# Patient Record
Sex: MALE
[De-identification: names, ages, dates, MRNs, and addresses within clinical notes are randomized per-mention and may not be internally consistent; named-entity substitution may affect disease eponyms.]

---

## 2018-06-18 LAB
ALBUMIN SERPL BCP-MCNC: 2.2 G/DL (ref 3.2–5.5)
ALP SERPL-CCNC: 313 IU/L (ref 42–121)
ALT SERPL W P-5'-P-CCNC: 36 IU/L (ref 10–60)
AST SERPL W P-5'-P-CCNC: 66 IU/L (ref 10–42)
BUN BLD-MCNC: 20 MG/DL (ref 6–20)
FERRITIN SERPL-MCNC: > 1500 NG/ML (ref 23.9–336.2)
GLUCOSE SERPLBLD-MCNC: 121 MG/DL (ref 65–120)
IRON SERPL-MCNC: 41 UG/DL (ref 45–182)
POTASSIUM SERPL-SCNC: 5.1 MEQ/L (ref 3.6–5)
TRANSFERRIN SERPL-MCNC: 147 MG/DL (ref 180–329)

## 2018-06-19 ENCOUNTER — HOSPITAL ENCOUNTER (OUTPATIENT)
Dept: HOSPITAL 97 - DS | Age: 78
Discharge: HOME | End: 2018-06-19
Attending: INTERNAL MEDICINE
Payer: COMMERCIAL

## 2018-06-19 DIAGNOSIS — D50.9: ICD-10-CM

## 2018-06-19 DIAGNOSIS — D64.81: Primary | ICD-10-CM

## 2018-06-19 DIAGNOSIS — C34.31: ICD-10-CM

## 2018-06-19 LAB — HCT VFR BLD CALC: 29.2 % (ref 39.6–49)

## 2018-06-19 PROCEDURE — 82728 ASSAY OF FERRITIN: CPT

## 2018-06-19 PROCEDURE — 85018 HEMOGLOBIN: CPT

## 2018-06-19 PROCEDURE — 84466 ASSAY OF TRANSFERRIN: CPT

## 2018-06-19 PROCEDURE — 36430 TRANSFUSION BLD/BLD COMPNT: CPT

## 2018-06-19 PROCEDURE — 86900 BLOOD TYPING SEROLOGIC ABO: CPT

## 2018-06-19 PROCEDURE — 86901 BLOOD TYPING SEROLOGIC RH(D): CPT

## 2018-06-19 PROCEDURE — 85014 HEMATOCRIT: CPT

## 2018-06-19 PROCEDURE — 86850 RBC ANTIBODY SCREEN: CPT

## 2018-06-19 PROCEDURE — 83540 ASSAY OF IRON: CPT

## 2018-06-19 PROCEDURE — 80053 COMPREHEN METABOLIC PANEL: CPT

## 2018-06-19 PROCEDURE — 36415 COLL VENOUS BLD VENIPUNCTURE: CPT

## 2018-06-19 NOTE — XMS REPORT
Clinical Summary

 Created on:2018



Patient:Robi Alcocer

Sex:Male

:1940

External Reference #:[id number]





Demographics







 Address  [address] [place] [address]

 

 Home Phone  [phone]

 

 Mobile Phone  [phone]

 

 Email Address  [email or web address]

 

 Preferred Language  English

 

 Marital Status  

 

 [employer] Affiliation  Unknown

 

 Race  White

 

 Ethnic Group  Not  or 









Author







 Organization  [facility]

 

 Address  [address] [place] [address]









Support







 Name  Relationship  Address  Phone

 

 Leodan White  Spouse  Unavailable  [phone]









Care Team Providers







 Name  Role  Phone

 

 Zoya Monaco DO  Primary Care Provider  [phone]









Allergies

No Known Allergies



Current Medications







 Prescription  Sig.  Disp.  Refills  Start Date  End Date  Status

 

 atorvastatin  TK 1 T PO QD    1  2017    Active



 (LIPITOR) 40 MG  HS          



 tablet            

 

 metoprolol tartrate  1 tablet daily    0  2017    Active



 (LOPRESSOR) 50 mg            



 tablet            

 

 aspirin (ECOTRIN)  Take 81 mg by          Active



 81 MG enteric  mouth daily.          



 coated tablet            

 

 clopidogrel  Take 75 mg by          Active



 (PLAVIX) 75 mg  mouth daily.          



 tablet            

 

 clopidogrel  TK 1 T PO QD    1  2017  Discontinued



 (PLAVIX) 75 mg            



 tablet            

 

 traMADol (ULTRAM)  Take 1 tablet  20 tablet  0  2017  10/04/2017  



 50 mg tablet  (50 mg total)          



   by mouth every          



   6 (six) hours          



   as needed for          



   moderate pain          



   for up to 10          



   days.          









 Hospital, Clinic,  Ordered Dose  Route  Frequency  Start Date  End Date  Status



 or Other Facility            



 Administered            



 Medication            

 

 acetaminophen  650 mg  oral  every 6 hours  2017    Active



 (TYLENOL) tablet      PRN      



 650 mg            

 

 acetaminophen  650 mg  oral  every 4 hours  2017  Discontinued



 (TYLENOL) tablet      PRN    7  



 650 mg            







Active Problems







 Problem  Noted Date

 

 Carotid stenosis  2017

 

 CVA (cerebral vascular accident)  2017

 

 Bilateral carotid artery stenosis  2017







Encounters







 Date  Type  Specialty  Care Team  Description

 

 10/05/2017  Office Visit  Cardiovascular  Ch[facility]ler  Surgery follow-up



       Bailee  examination (Primary



       Satish Ellington)



       MD  

 

 10/05/2017  Documentation  Cardiovascular  Asya Joshi MA  

 

 2017  Orders Only  Cardiovascular  Asya Joshi,  Status post carotid



       MA  surgery (Primary Dx)

 

 2017  Hospital Encounter  Cardiology  Chandler,  Arteriosclerosis of



 -      Bailee  carotid artery,



 2017      Rakel,  unspecified laterality



       MD  

 

 2017  Procedure Pass  Cardiothoracic    



     Surgery    

 

 2017  Surgery  Cardiothoracic  Chandler,  LEFT CAROTID



     Surgery  Bailee  ENDARTERECTOMY, TCD



       Rakel,  AND EEG MONITORING



       MD  

 

 2017  Hospital Encounter  Radiology  Chandler,  Preop testing



       Bailee Ellington MD  

 

 2017  Pre-Admit Testing  Pre-Admission  Chandler,  Preop testing (
Primary Dx);



   Appointment  Testing  Bailee  Stenosis of carotid artery, unspecified 
laterality



       MD Rakel  

 

 2017  Anesthesia Event  Cardiothoracic  Alvaro,  



     Surgery  Edilma MORENO,  



       APRN  

 

 2017  Office Visit  Cardiovascular  Chandler,  Bilateral carotid



       Bailee  artery stenosis



       Rakel  (Primary Dx)



       MD  

 

 2017  Orders Only  Cardiovascular  Chaves,  Stenosis of carotid



       Martine, MA  artery, unspecified



         laterality (Primary



         Dx)



after 2017



Family History







 Medical History  Relation  Name  Comments

 

 No Known Problems  Father    

 

 No Known Problems  Mother    









 Relation  Name  Status  Comments

 

 Father      

 

 Mother      







Social History







 Tobacco Use  Types  Packs/Day  Years Used  Date

 

 Former Smoker        Quit: 









 Smokeless Tobacco: Never Used      









 Alcohol Use  Drinks/Week  oz/Week  Comments

 

 Yes      occasinal/random









 Sex Assigned at Birth  Date Recorded

 

 Not on file  







Last Filed Vital Signs







 Vital Sign  Reading  Time Taken

 

 Blood Pressure  121/68  10/05/2017  1:02 PM CDT

 

 Pulse  81  10/05/2017  1:02 PM CDT

 

 Temperature  36.7 C (98.1 F)  10/05/2017  1:02 PM CDT

 

 Respiratory Rate  14  10/05/2017  1:02 PM CDT

 

 Oxygen Saturation  96%  2017  4:57 PM CDT

 

 Inhaled Oxygen Concentration  -  -

 

 Weight  73.5 kg (162 lb)  10/05/2017  1:02 PM CDT

 

 Height  175.3 cm (5' 9")  10/05/2017  1:02 PM CDT

 

 Body Mass Index  23.92  10/05/2017  1:02 PM CDT







Plan of Treatment







 Health Maintenance  Due Date  Last Done  Comments

 

 SHINGRIX VACCINE (#1)  1991    

 

 ZOSTER VACCINE  2001    

 

 PNEUMOCOCCAL POLYSACCHARIDE VACCINE AGE 65 AND OVER  2006    

 

 PNEUMOCOCCAL-13  2006    

 

 INFLUENZA VACCINE  2018    







Implants







 Implanted  Type  Area    Device  Expiration  Model /



         Identifier  Date  Serial / Lot

 

 Clip Ligtng Weck Hemoclip Plus W/ Tape Ti Med - [id number]  Medical  N/A:  
TELEFLEX      [id number] /



 Implanted: 2017 (Quantity not on file)  Clips for  N/A  MEDICAL       /



   Internal          



   Use          

 

 Clip Ligtng Weck Hemoclip Plus W/ Tape Ti Sm Strngpnt - [id number]  Medical  N/A
:  WECK CLOSURE      [id number] /



 Implanted: 2017 (Quantity not on file)  Clips for  N/A  SYSTEMS       /



   Internal          



   Use          

 

 Kit Shunt Crtd Artery Rdopq Line 6in Strl [place] - [id number]  Surgical  N/A:  
COVIDIEN BOZENA    2021  [id number] /



 Implanted: 2017 (Quantity not on file)  Implants;  N/A  HEALTHCARE       
/



   Expanders;          [id number]



   Extenders;          



   Surgical          



   Wires          

 

 Fibrin Sealant Patch Evarrest - [id number]  Surgical  N/A:  ETHICON    2018  [id number] /



 Implanted: 2017 (Quantity not on file)  Implants;  N/A  ENDO-SURGERY    
   /



   Expanders;          [id number]



   Extenders;          



   Surgical          



   Wires          

 

 Patch Vasclr Perph 0.8x8cm Vascu-Guard - [id number]  Vascular  N/A:  CALABRESE    
10/04/2021  [id number] /



 Implanted: Qty: 1 on 2017 by Bailee Reno MD  Graft  N/
A  BIOSCIENCE       /



             [id number]







Procedures







 Procedure Name  Priority  Date/Time  Associated  Comments



       Diagnosis  

 

 US CAROTID DUPLEX  Routine  10/05/2017 11:50  Status post  Results for this



 BILATERAL    AM CDT  carotid surgery  procedure are in



         the results



         section.

 

 INTRAOPERATIVE  Routine  2017  2:54    Results for this



 MONITORING    PM CDT    procedure are in



         the results



         section.

 

 CBC WITH PLATELET AND  Routine  2017  5:20    Results for this



 DIFFERENTIAL    AM CDT    procedure are in



         the results



         section.

 

 ESTIMATED GFR  Routine  2017  4:00    Results for this



     AM CDT    procedure are in



         the results



         section.

 

 BASIC METABOLIC PANEL  Routine  2017  4:00    Results for this



     AM CDT    procedure are in



         the results



         section.

 

 GLUCOSE LEVEL, SYRINGE  STAT  2017  4:13    Results for this



     PM CDT    procedure are in



         the results



         section.

 

 IONIZED CALCIUM,  STAT  2017  4:13    Results for this



 ARTERIAL    PM CDT    procedure are in



         the results



         section.

 

 POTASSIUM, SYRINGE  STAT  2017  4:13    Results for this



     PM CDT    procedure are in



         the results



         section.

 

 HEMOGLOBIN, SYRINGE  STAT  2017  4:13    Results for this



     PM CDT    procedure are in



         the results



         section.

 

 SODIUM LEVEL, SYRINGE  STAT  2017  4:13    Results for this



     PM CDT    procedure are in



         the results



         section.

 

 ARTERIAL BLOOD GAS,  STAT  2017  4:13    Results for this



 CORRECTED    PM CDT    procedure are in



         the results



         section.

 

 GLUCOSE LEVEL, SYRINGE  STAT  2017  3:32    Results for this



     PM CDT    procedure are in



         the results



         section.

 

 IONIZED CALCIUM,  STAT  2017  3:32    Results for this



 ARTERIAL    PM CDT    procedure are in



         the results



         section.

 

 SODIUM LEVEL, SYRINGE  STAT  2017  3:32    Results for this



     PM CDT    procedure are in



         the results



         section.

 

 HEMOGLOBIN, SYRINGE  STAT  2017  3:32    Results for this



     PM CDT    procedure are in



         the results



         section.

 

 POTASSIUM, SYRINGE  STAT  2017  3:32    Results for this



     PM CDT    procedure are in



         the results



         section.

 

 ARTERIAL BLOOD GAS,  STAT  2017  3:32    Results for this



 CORRECTED    PM CDT    procedure are in



         the results



         section.

 

 DC AN ELECTIVE  Routine  2017  3:05    



 ENDOTRACHEAL AIRWAY    PM CDT    









   Procedure Note - Janes Clay MD - 2017  3:03 PM CDT



Airway



   Performed by: JANES CLAY



   Authorized by: JANES CLAY



   



   Location:  Endoscopy



   Urgency:  Elective



   Difficult Airway: No



   Anesthesiologist:  JANES CLAY



   Resident/CRNA:  RODRICK RIVERA



   Performed by:



      anesthesiologist and resident/CRNA



   Preoxygenated with 100% O2: Yes



   Mask Ventilation:  Easy mask



   Final Airway Type:  Endotracheal airway



   Final Endotracheal Airway:  ETT



   Cuffed: Yes



   Technique Used:  Direct laryngoscopy



   Insertion Site:  Oral



   Blade Type:  Chiu



   Laryngoscope Blade/Videolaryngoscope Blade Size:  2



   ETT Size (mm):  7.0



   Cuff at minimum occlusion pressure: Yes



   Measured from:  Lips



   ETT to Lips (cm):  23



   Placement Verified by: direct visualization



   Laryngoscopic view:  Grade I - full view of glottis



   Number of Attempts at Approach:  2



    First attempt by student CRNA, adonis 2a view but unable to pass ETT. 2nd 
attempt attending: Mikki. ETT passed atraumatically without stylet. No 
injury to lips, oropharynx, vocal cords









 ARTERIAL LINE  Routine  2017  3:03    Results for this



     PM CDT    procedure are in



         the results



         section.

 

 SURGICAL PATHOLOGY  Routine  2017  7:53    Results for this



 REQUEST    AM CDT    procedure are in



         the results



         section.

 

 XR CHEST 2 VW  Routine  2017 11:55  Preop testing  Results for this



     AM CDT    procedure are in



         the results



         section.

 

 HC COMPLETE BLD COUNT  STAT  2017 11:25  Stenosis of carotid  Results 
for this



 W/AUTO DIFF    AM CDT  artery, unspecified  procedure are in



       laterality  the results



         section.

 

 ECG PRE/POST OP  Routine  2017 11:10  Preop testing  Results for this



     AM CDT    procedure are in



         the results



         section.

 

 PREPARE RBC  Routine  2017 10:27    Results for this



     AM CDT    procedure are in



         the results



         section.

 

 ESTIMATED GFR  STAT  2017 10:27    Results for this



     AM CDT    procedure are in



         the results



         section.

 

 TYPE AND SCREEN  Routine  2017 10:27  Preop testing  Results for this



     AM CDT    procedure are in



         the results



         section.

 

 PARTIAL THROMBOPLASTIN  STAT  2017 10:27  Preop testing  Results for this



 TIME (PTT)    AM CDT    procedure are in



         the results



         section.

 

 PROTHROMBIN TIME WITH  STAT  2017 10:27  Preop testing  Results for this



 INR    AM CDT    procedure are in



         the results



         section.

 

 CBC HEMOGRAM  STAT  2017 10:27  Preop testing  Results for this



     AM CDT    procedure are in



         the results



         section.

 

 COMPREHENSIVE METABOLIC  STAT  2017 10:27  Stenosis of carotid  Results 
for this



 PANEL    AM CDT  artery, unspecified  procedure are in



       laterality  the results



         section.

 

 URINALYSIS SCREEN AND  STAT  2017 10:24    Results for this



 MICROSCOPY, WITH REFLEX    AM CDT    procedure are in



 TO CULTURE        the results



         section.

 

 URINE CULTURE  STAT  2017 10:24    Results for this



     AM CDT    procedure are in



         the results



         section.

 

 ECHOCARDIOGRAM 2D  Routine  2017 12:11    Results for this



 COMPLETE W MMODE    PM CDT    procedure are in



 SPECTRAL COLOR DOPPLER        the results



 (06153)        section.



after 2017



Results

Pv carotid duplex (10/05/2017 11:50 AM)





 Narrative  Performed At

 

 PERIPHERAL VASCULAR LABORATORY



  [facility]



  



  



 Carotid Duplex Report



  



  



  



 [address] [place], [place] [phone]



  



  



  



 For  purposes, the categorization of the degree of the  



 stenosis of this exam is based on criteria described in the IAC carotid  



 stenosis grading white paper( www.intersocietal.org/Vascular) and in  



 KHURRAM Martines., KIMBERLY Fitzpatrick, et al. Carotid



  



  artery stenosis: gray-scale and Doppler US diagnosis--Society of  



 Radiologists in Ultrasound Consensus Conference. Radiology. 2003 Nov;  



 229(2):340-6.



  



  



  



 Pat.Name:ROBI ALCOCER  



 Pat.ID:[id number] 



  



 .Date: 10/5/2017  



 Refer.MD:BAILEE RENO MD 



  



 Exam Time: 10:07:00 AM Study  



 Type:Carotid 



  



 DOBAge:1940,77Y Sex:  



 MALE



  



 Sonogrphr: [facility] [place] RDMS, [facility] CPT - 4:  



 [address] 



  



 Echo Event ID:[id number]



  



 Order ID:[id number]



  



 Reason for Study:Follow-up s/p left carotid endarterectomy with patch



  



 angioplasty on 2017.



  



 Procedures:2017Left carotid endarterectomy with patch



  



 angioplasty.



  



 Race:C 



  



 ------------------------------------



  



 SUMMARY:



  



 ------------------------------------



  



 CAROTID ARTERY SCAN



  



  RIGHT:There is smooth intimal lining in the common carotid artery. 



  



 There is irregular calcified plaque noted in the bulb extending into



  



 the proximal internal carotid artery.Colorflow is  



 disturbed.There



  



 is antegrade flow in the vertebral artery. 



  



  



  



  LEFT:There is smooth homogenous plaque in the common carotid



  



 artery.There is no plaque noted in the bulb and the internal carotid



  



 artery, s/p carotid endarterectomy.Colorflow is normal.There is



  



 antegrade flow in the vertebral artery. 



  



  



  



  PHYSICIAN INTERPRETATION 



  



  



  



  1.50-69% stenosis of the right internal carotid artery.



  



  2.Left internal carotid artery is normal, s/p carotid



  



 endarterectomy.



  



  3. Both vertebral arteries are antegrade



  



  



  



 Carotid  



 Findings:RightLeft  



  



  



 Verteb.Flw  



 AntegradeAntegrade  



 



  



 Subclavian  



 TriphasicBiphasic  



  



  



 ------------------------------------



  



 MEASUREMENTS:



  



 ------------------------------------



  



 DOPPLER



  



 Left Bulb



  



  Bulb  cm/sBulb  



 EDV17.3 cm/s



  



 Left CCA Dist



  



  CCA Dist  cm/sCCA Dist  



 EDV28.3 cm/s



  



 Left CCA Mid



  



  CCA Mid [id number] cm/sCCA Mid EDV  



 18.9 cm/s



  



 Left CCA Prox



  



  CCA Prox  cm/sCCA Prox  



 EDV18.1 cm/s



  



 Left ICA Dist



  



  ICA Dist  cm/Lilia Dist  



 EDV34 cm/s



  



 Left ICA Mid



  



  ICA Mid PSV 44.8 cm/Lilia Mid EDV  



 12.9 cm/s



  



 Left ICA Prox



  



  ICA Prox  cm/Lilia Prox  



 EDV14.1 cm/s



  



 Left ECA Prox



  



  ECA Prox  cm/sECA Prox  



 EDV11.7 cm/s



  



 Left Vertebral



  



  Vertebral PSV 93.5 cm/sVertebral EDV 18.9  



 cm/s



  



 Right Bulb



  



  Bulb  cm/sBulb  



 EDV26.7 cm/s



  



 Right CCA Dist



  



  CCA Dist  cm/sCCA Dist  



 EDV28.3 cm/s



  



 Right CCA Mid



  



  CCA Mid WKS351 cm/sCCA Mid EDV  



 22.8 cm/s



  



 Right CCA Prox



  



  CCA Prox  cm/sCCA Prox  



 EDV23.6 cm/s



  



 Right ICA Dist



  



  ICA Dist  cm/Lilia Dist  



 EDV32.2 cm/s



  



 Right ICA Mid



  



  ICA Mid PSV 99.2 cm/Lilia Mid EDV  



 27.5 cm/s



  



 Right ICA Prox



  



  ICA Prox  cm/Lilia Prox  



 EDV45.8 cm/s



  



 Right ECA Prox



  



  ECA Prox  cm/sECA Prox  



 EDV11.8 cm/s



  



 Right Vertebral



  



  Vertebral DZR100 cm/sVertebral EDV 21.2  



 cm/s



  



 Right ICA/CCA Ratio



  



  ICA/CCA PSV 1.35



  



 Left ICA/CCA Ratio



  



  ICA/CCA PSV1.1 



  



  



  



 Signed 10/05/2017 03:22 PM



  



 Gregor Dacosta MD, RPVI  









 Procedure Note

 

 Interface, Radiology Results In - 10/05/2017  3:22 PM CDT



                    PERIPHERAL VASCULAR LABORATORY



 



                         Carotid Duplex Report



 



         [address] [place]  [address] [phone]



 



 For  purposes, the categorization of the degree of the 
stenosis of this exam is based on criteria described in the IAC carotid 
stenosis grading white paper( www.intersocietal.org/Vascular) and in



 KHURRAM Martines., KIMBERLY Fitzpatrick, et al. Carotid



  artery stenosis: gray-scale and Doppler US diagnosis--Society of Radiologists 
in Ultrasound Consensus Conference. Radiology. 2003 Nov; 229(2):340-6.



 



 Pat.Name:  ROBI ALCOCER           Pat.ID:    [id number]



 St.Date:   10/5/2017               Refer.MD:  BAILEE RENO MD



 Exam Time: 10:07:00 AM             Study Type:Carotid



   Age:  1940,77Y           Sex:       MALE



 Sonogrphr: Home JORGE WestMS, RVT CPT - 4:   67803



 Echo Event ID:[id number]



 Order ID:  [id number]



 Reason for Study:Follow-up s/p left carotid endarterectomy with patch



 angioplasty on 2017.



 Procedures:2017  Left carotid endarterectomy with patch



 angioplasty.



 Race:      C



 ------------------------------------



 SUMMARY:



 ------------------------------------



 CAROTID ARTERY SCAN



  RIGHT:  There is smooth intimal lining in the common carotid artery.



 There is irregular calcified plaque noted in the bulb extending into



 the proximal internal carotid artery.  Colorflow is disturbed.  There



 is antegrade flow in the vertebral artery.



 



  LEFT:  There is smooth homogenous plaque in the common carotid



 artery.  There is no plaque noted in the bulb and the internal carotid



 artery, s/p carotid endarterectomy.  Colorflow is normal.  There is



 antegrade flow in the vertebral artery.



 



  PHYSICIAN INTERPRETATION



 



  1.  50-69% stenosis of the right internal carotid artery.



  2.  Left internal carotid artery is normal, s/p carotid



 endarterectomy.



  3. Both vertebral arteries are antegrade



 



 Carotid Findings:      Right                  Left



 Verteb.Flw             Antegrade              Antegrade



 Subclavian             Triphasic              Biphasic



 ------------------------------------



 MEASUREMENTS:



 ------------------------------------



                                 DOPPLER



 Left Bulb



  Bulb PSV         108 cm/s          Bulb EDV        17.3 cm/s



 Left CCA Dist



  CCA Dist PSV     119 cm/s          CCA Dist EDV    28.3 cm/s



 Left CCA Mid



  CCA Mid PSV      101 cm/s          CCA Mid EDV     18.9 cm/s



 Left CCA Prox



  CCA Prox PSV     115 cm/s          CCA Prox EDV    18.1 cm/s



 Left ICA Dist



  ICA Dist PSV     113 cm/s          ICA Dist EDV      34 cm/s



 Left ICA Mid



  ICA Mid PSV     44.8 cm/s          ICA Mid EDV     12.9 cm/s



 Left ICA Prox



  ICA Prox PSV     106 cm/s          ICA Prox EDV    14.1 cm/s



 Left ECA Prox



  ECA Prox PSV     101 cm/s          ECA Prox EDV    11.7 cm/s



 Left Vertebral



  Vertebral PSV   93.5 cm/s          Vertebral EDV   18.9 cm/s



 Right Bulb



  Bulb PSV         132 cm/s          Bulb EDV        26.7 cm/s



 Right CCA Dist



  CCA Dist PSV     116 cm/s          CCA Dist EDV    28.3 cm/s



 Right CCA Mid



  CCA Mid PSV      124 cm/s          CCA Mid EDV     22.8 cm/s



 Right CCA Prox



  CCA Prox PSV     132 cm/s          CCA Prox EDV    23.6 cm/s



 Right ICA Dist



  ICA Dist PSV     101 cm/s          ICA Dist EDV    32.2 cm/s



 Right ICA Mid



  ICA Mid PSV     99.2 cm/s          ICA Mid EDV     27.5 cm/s



 Right ICA Prox



  ICA Prox PSV     167 cm/s          ICA Prox EDV    45.8 cm/s



 Right ECA Prox



  ECA Prox PSV     123 cm/s          ECA Prox EDV    11.8 cm/s



 Right Vertebral



  Vertebral PSV    124 cm/s          Vertebral EDV   21.2 cm/s



 Right ICA/CCA Ratio



  ICA/CCA PSV     1.35



 Left ICA/CCA Ratio



  ICA/CCA PSV      1.1



 



 Signed 10/05/2017 03:22 PM



 Gregor Dacosta MD, RPVI









 Performing Organization  Address  City/State/Zipcode  Phone Number

 

  [facility]  [address] [place] [address]  



Intraoperative monitoring (2017  2:54 PM)





 Narrative  Performed At

 

 This result has an attachment that is not available.



  



  INTRAOPERATIVE NEURO MONITORING  



   



   



 Patient Name: Robi Alcocer  



 YOB: 1940  



 Gender: male  



 Date of Service: 2017  



   



   



 Surgical Procedure: Left Carotid Endarterectomy  



   



 OR: (FOR)  



 Room #: (4)  



 Tech #1: (KB/TG)  



 Start Time: (1153)  



 End Time: (1605)  



 Total Time (in hours): (4)  



   



 Physician IOM Time: 4  



 Procedure(s) performed During IOM:  



 90162 Electroencephalogram (EEG) during non-intracranial surgery x 1  



 31086 IOM remote or multiple cases x 2  



   



 ICD10:I77.9; I65.29; I63.132  



   



   



   



 Stimulation Parameters  



 Free running EEG recorded with bipolar derivation, using a modified  



 International 10/20 placements: Fp1-C3, C3-O1, Fp2-C4, C4-O2, T3-O1,  



 T4-O2, Fp1-T3, Fp2-T4.  



   



 Technical Summary  



 Intraoperative neurophysiological monitoring was performed using  



 free-running EEG. A real time connection with the examining neurologist  



 was established and maintained throughout the operative procedure by the  



 monitoring technologist. Pre-operative EEG was recorded in the holding  



 area and revealed no asymmetry. Free running EEG demonstrated symmetrical  



 high amplitude delta with superimposed faster frequencies most likely  



 secondary to inhaled anesthetic agents. After clamping of the left carotid  



 artery the EEG was judged to be essentially unchanged and the surgeon was  



 informed.  



   



 Conclusion  



 Free running EEG remained symmetrical throughout the remaining of the  



 procedure. No focal changes occurred. The surgeon was informed of all  



 findings in real time.  



   



   



CBC with platelet and differential (2017  5:20 AM)Only the most recent 
of2 resultswithin the time period is included.





 Component  Value  Ref Range  Performed At

 

 WBC  9.91  4.50 - 11.00 k/uL  [facility] DEPARTMENT OF



       PATHOLOGY AND GENOMIC



       MEDICINE

 

 RBC  3.57 (L)  4.40 - 6.00 m/uL  [facility] DEPARTMENT OF



       PATHOLOGY AND GENOMIC



       MEDICINE

 

 HGB  9.9 (L)  14.0 - 18.0 g/dL  [facility] DEPARTMENT OF



       PATHOLOGY AND GENOMIC



       MEDICINE

 

 HCT  31.6 (L)  41.0 - 51.0 %  [facility] DEPARTMENT OF



       PATHOLOGY AND GENOMIC



       MEDICINE

 

 MCV  88.5  82.0 - 100.0 fL  [facility] DEPARTMENT OF



       PATHOLOGY AND GENOMIC



       MEDICINE

 

 MCH  27.7  27.0 - 34.0 pg  [facility] DEPARTMENT OF



       PATHOLOGY AND GENOMIC



       MEDICINE

 

 MCHC  31.3  31.0 - 37.0 g/dL  [facility] DEPARTMENT OF



       PATHOLOGY AND GENOMIC



       MEDICINE

 

 RDW - SD  45.1  37.0 - 55.0 fL  [facility] DEPARTMENT OF



       PATHOLOGY AND GENOMIC



       MEDICINE

 

 MPV  9.7  8.8 - 13.2 fL  [facility] DEPARTMENT OF



       PATHOLOGY AND GENOMIC



       MEDICINE

 

 Platelet count  387  150 - 400 k/uL  [facility] DEPARTMENT OF



       PATHOLOGY AND GENOMIC



       MEDICINE

 

 Nucleated RBC  0.00  /100 WBC  [facility] DEPARTMENT OF



       PATHOLOGY AND GENOMIC



       MEDICINE

 

 Neutrophils  70.0 (H)  39.0 - 69.0 %  [facility] DEPARTMENT OF



       PATHOLOGY AND GENOMIC



       MEDICINE

 

 Lymphocytes  18.6 (L)  25.0 - 45.0 %  [facility] DEPARTMENT OF



       PATHOLOGY AND GENOMIC



       MEDICINE

 

 Monocytes  8.1  0.0 - 10.0 %  [facility] DEPARTMENT OF



       PATHOLOGY AND GENOMIC



       MEDICINE

 

 Eosinophils  2.3  0.0 - 5.0 %  [facility] DEPARTMENT OF



       PATHOLOGY AND GENOMIC



       MEDICINE

 

 Basophils  0.5  0.0 - 1.0 %  [facility] DEPARTMENT OF



       PATHOLOGY AND GENOMIC



       MEDICINE

 

 Immature granulocytes  0.5Comment:  0.0 - 1.0 %  [facility] DEPARTMENT OF



   "Immature    PATHOLOGY AND GENOMIC



   granulocytes"    MEDICINE



   (promyelocytes,    



   myelocytes,    



   metamyelocytes)    









 Specimen

 

 Blood









 Performing Organization  Address  City/State/Zipcode  Phone Number

 

 [facility] DEPARTMENT OF PATHOLOGY AND  [address] [place] [address]  



 [employer] [facility]      



Estimated GFR (2017  4:00 AM)Only the most recent of2 resultswithin the 
time period is included.





 Component  Value  Ref Range  Performed At

 

 GFR Non Af Amer  59 (A)  mL/min/1.73 m2  [facility] DEPARTMENT OF



       PATHOLOGY AND GENOMIC



       MEDICINE

 

 GFR Af Amer  71  mL/min/1.73 m2  [facility] DEPARTMENT OF



   Comment:    PATHOLOGY AND GENOMIC



   Chronic kidney disease: <60 mL/min/1.73m2    MEDICINE



   Kidney failure: <15 mL/min/1.73m2    



   The estimated GFR is calculated from the IDMS-traceable Modification of Diet
    



   in Renal Disease Equation. The accuracy of the calculation is poor when the 
   



   creatinine is normal. Calculated values >90 mL/min/1.73m2 are not reported. 
   



   This equation has not been validated in children (<18 years), pregnant    



   women, the elderly (>70 years), or ethnic groups other than Caucasians and  
  



    Americans.    



       









 Specimen

 

 Plasma specimen









 Performing Organization  Address  City/[facility]  Phone Number

 

 [facility] DEPARTMENT OF PATHOLOGY AND  [address] [place] [address]      



Basic metabolic panel (2017  4:00 AM)





 Component  Value  Ref Range  Performed At

 

 Sodium  137  135 - 148 mEq/L  [facility] DEPARTMENT OF PATHOLOGY



       AND GENOMIC MEDICINE

 

 Potassium  4.3  3.5 - 5.0 mEq/L  [facility] DEPARTMENT OF PATHOLOGY



       AND GENOMIC MEDICINE

 

 Chloride  98  98 - 112 mEq/L  [facility] DEPARTMENT OF PATHOLOGY



       AND GENOMIC MEDICINE

 

 CO2  27  24 - 31 mEq/L  [facility] DEPARTMENT OF PATHOLOGY



       AND GENOMIC MEDICINE

 

 Anion gap  12  7 - 15 mEq/L  [facility] DEPARTMENT OF PATHOLOGY



   Comment:    AND [facility]



   Starting from  , anion gap calculation    



   no longer incorporates potassium. Please note the change.    



       

 

 BUN  15  8 - 23 mg/dL  [facility] DEPARTMENT OF PATHOLOGY



       AND GENOMIC MEDICINE

 

 Creatinine  1.2  0.7 - 1.2 mg/dL  [facility] DEPARTMENT OF PATHOLOGY



       AND GENOMIC MEDICINE

 

 Glucose  129 (H)  65 - 99 mg/dL  [facility] DEPARTMENT OF PATHOLOGY



       AND GENOMIC MEDICINE

 

 Calcium  9.5  8.8 - 10.2 mg/dL  [facility] DEPARTMENT OF PATHOLOGY



       AND GENOMIC MEDICINE









 Specimen

 

 Plasma specimen









 Performing Organization  Address  City/[facility]  Phone Number

 

 [facility] DEPARTMENT OF PATHOLOGY AND  [address] [place] [address]  



 GENOMIC MEDICINE      



Sodium level, syringe (2017  4:13 PM)Only the most recent of2 
resultswithin the time period is included.





 Component  Value  Ref Range  Performed At

 

 Sodium, syringe  133 (L)  135 - 148 mEq/L  [facility] DEPARTMENT OF PATHOLOGY AND 
GENOMIC



       MEDICINE









 Specimen

 

 Blood









 Performing Organization  Address  City/[facility]  Phone Number

 

 [facility] DEPARTMENT OF PATHOLOGY AND  [address] [place] [address] [phone] [place] [address]      



Potassium, syringe (2017  4:13 PM)Only the most recent of2 resultswithin 
the time period is included.





 Component  Value  Ref Range  Performed At

 

 Potassium, syringe  4.5  3.5 - 5.0 mEq/L  [facility] DEPARTMENT OF PATHOLOGY AND 
GENOMIC



       MEDICINE









 Specimen

 

 Blood









 Performing Organization  Address  City/[facility]  Phone Number

 

 [facility] DEPARTMENT OF PATHOLOGY AND  [address] [place] [address]      



Ionized calcium, arterial (2017  4:13 PM)Only the most recent of2 
resultswithin the time period is included.





 Component  Value  Ref Range  Performed At

 

 Ionized calcium, arterial  1.30  1.11 - 1.32 mmol/L  [facility] DEPARTMENT OF 
PATHOLOGY AND



       GENOMIC MEDICINE









 Specimen

 

 Blood









 Performing Organization  Address  City/[facility]  Phone Number

 

 [facility] DEPARTMENT OF PATHOLOGY AND  [address] [place] [address]      



Hemoglobin, syringe (2017  4:13 PM)Only the most recent of2 resultswithin 
the time period is included.





 Component  Value  Ref Range  Performed At

 

 Hemoglobin, syringe  9.1 (L)  14.0 - 18.0 g/dL  [facility] DEPARTMENT OF PATHOLOGY 
AND GENOMIC



       MEDICINE









 Specimen

 

 Blood









 Performing Organization  Address  City/[facility]  Phone Number

 

 [facility] DEPARTMENT  PATHOLOGY AND  [address] [place] [address]      



Glucose level, syringe (2017  4:13 PM)Only the most recent of2 
resultswithin the time period is included.





 Component  Value  Ref Range  Performed At

 

 Glucose, syringe  172 (H)  65 - 99 mg/dL  [facility] DEPARTMENT OF PATHOLOGY AND 
GENOMIC



       MEDICINE









 Specimen

 

 Blood









 Performing Organization  Address  City/[facility]  Phone Number

 

 [facility] DEPARTMENT OF PATHOLOGY AND  [address] [place] [address]      



Arterial blood gas, corrected (2017  4:13 PM)Only the most recent of2 
resultswithin the time period is included.





 Component  Value  Ref Range  Performed At

 

 pH, arterial  7.37  7.35 - 7.45  [facility] DEPARTMENT OF PATHOLOGY AND GENOMIC



       MEDICINE

 

 pCO2, arterial  36  35 - 45 mmHg  [facility] DEPARTMENT OF PATHOLOGY AND GENOMIC



       MEDICINE

 

 pO2, arterial  304 (H)  80 - 90 mmHg  [facility] DEPARTMENT OF PATHOLOGY AND GENOMIC



       MEDICINE

 

 Temperature, Celsius  37.0  Degrees C  [facility] DEPARTMENT OF PATHOLOGY AND GENOMIC



       MEDICINE

 

 O2 saturation, arterial  100  95 - 100 %  [facility] DEPARTMENT OF PATHOLOGY AND 
GENOMIC



       MEDICINE

 

 pH, arterial corrected  7.37    [facility] DEPARTMENT OF PATHOLOGY AND GENOMIC



       MEDICINE

 

 pCO2, arterial corrected  36  mmHg  [facility] DEPARTMENT OF PATHOLOGY AND GENOMIC



       MEDICINE

 

 pO2, arterial corrected  304  mmHg  [facility] DEPARTMENT OF PATHOLOGY AND GENOMIC



       MEDICINE

 

 Base excess, arterial  -4 (L)  -2 - 2 mEq/L  [facility] DEPARTMENT OF PATHOLOGY AND 
GENOMIC



       MEDICINE









 Specimen

 

 Blood









 Performing Organization  Address  City/[facility]  Phone Number

 

 [facility] DEPARTMENT OF PATHOLOGY AND  [address] [place] [address]  



 GENOMIC MEDICINE      



Arterial line (2017  3:03 PM)





 Narrative  Performed At

 

 Thad Santiago MD 2017 12:34 PM



  



 Arterial line



  



 Performed by: JYOTI SANTIAGO



  



 Authorized by: BAILEE RENO 



  



  



  



 Patient Location:Pre-op



  



 Staff: 



  



 Resident/CRNA:JYOTI SANTIAGO



  



 Pre-procedure: patient identified, IV checked, site and side verified, 



  



 risks and benefits discussed, procedure verified, surgical consent 



  



 complete and pre-op evaluation complete



  



 MSBT: antiseptic used, all elements of maximal sterile barrier technique  



 



  



 followed, hand hygiene performed, cap/gown used by other personnel and 



  



 solutions labeled



  



 Indications: 



  



 Indications: multiple ABGs and hemodynamic monitoring



  



 Anesthesia: 



  



 Anesthesia:General



  



 Procedure Details: 



  



 Arterial Line placement:Placed pre-induction



  



 Line placement site:Radial



  



 Line placement side:Right



  



 Arterial line gauge:20 G



  



 Number of attempts:1



  



 Ultrasound guidance used: No



  



 Post-procedure: 



  



 Post-procedure:Sterile dressing applied



  



 Post procedure circulation, sensation, movement:Normal



  



 Patient tolerance:Patient tolerated the procedure well with no 



  



 immediate complications



  



 Notes: 



  



  Wire removed intact. No apparent complications.  



Surgical pathology request (2017  7:53 AM)





 Component  Value  Ref Range  Performed At

 

 Case number  [id number]    [facility] DEPARTMENT OF



       PATHOLOGY AND GENOMIC



       MEDICINE

 

 Surgical pathology report  See link below for PDF    [facility] DEPARTMENT OF



   Lab Report    PATHOLOGY AND GENOMIC



       MEDICINE









 Performing Organization  Address  City/[facility]  Phone Number

 

 [facility] DEPARTMENT OF PATHOLOGY AND  [address] [place] [address]  



 [facility]      



XR Chest 2 Vw (2017 11:55 AM)





 Narrative  Performed At

 

 EXAMINATION:XR CHEST 2 VW



   [employer]



  



  



 CLINICAL HISTORY:Z01.818 Encounter for other preprocedural  



 examination, preop



  



  



  



 COMPARISON:No priors



  



  



  



 IMPRESSION:



  



  



  



 Heart and mediastinum are appropriate for technique. Lungs are clear. No  



 effusions noted. 



  



  



  



 [facility]-[id number]



  



   









 Procedure Note

 

  Interface, Radiology Results Incoming - 2017 12:04 PM CDT



EXAMINATION:  XR CHEST 2 VW



 



 CLINICAL HISTORY:  Z01.818 Encounter for other preprocedural examination, preop



 



 COMPARISON:  No priors



 



 IMPRESSION:



 



 Heart and mediastinum are appropriate for technique. Lungs are clear. No 
effusions noted.



 



 [facility]-[id number]









 Performing Organization  Address  City/[facility]/Zipcode  Phone Number

 

 [facility]  [address] [place] [address]  



ECG Pre/Post Op (2017 11:10 AM)





 Component  Value  Ref Range  Performed At

 

 Ventricular rate  79    [facility] MUSE

 

 Atrial rate  79    [facility] MUSE

 

 DC interval  136    [facility] MUSE

 

 QRSD interval  84    [facility]

 

 QT interval  370    [facility]

 

 QTC interval  424    [facility] MUSE

 

 P axis 1  66    [facility] MUSE

 

 QRS axis 1  73    [facility] MUSE

 

 T wave axis  49    [facility] MUSE

 

 EKG impression  Normal sinus rhythm-Normal ECG-No previous    [facility] MUSE



   ECGs available-Electronically Signed By    



   Julio STEINBERG, Alexys CORTES ([id number]) on 2017    



   3:54:21 PM    









 Performing Organization  Address  City/[facility]  Phone Number

 

 [facility] MUSE  [address] [place] [phone]  



Partial thromboplastin time, activated (2017 10:27 AM)





 Component  Value  Ref Range  Performed At

 

 PTT  30.2  23.0 - 36.0 sec  [facility] DEPARTMENT OF PATHOLOGY



   Comment:    AND [employer] MEDICINE



   PTT therapeutic range for unfractionated heparin is    



   61.0-112.0 seconds which corresponds to Anti-Xa    



   0.3-0.7 U/ml.    



       









 Specimen

 

 Blood









 Performing Organization  Address  City/[facility]  Phone Number

 

 [facility] DEPARTMENT OF PATHOLOGY AND  [address] [place] [address]  



 GENOMIC MEDICINE      



Prothrombin time with INR (2017 10:27 AM)





 Component  Value  Ref Range  Performed At

 

 Prothrombin time  14.3  12.0 - 15.0 sec  [facility] DEPARTMENT OF



       PATHOLOGY AND GENOMIC



       MEDICINE

 

 INR  1.1    [facility] DEPARTMENT OF



   Comment:    PATHOLOGY AND GENOMIC



   The International Normalized Ratio (INR) is a therapeutic    MEDICINE



   monitoring tool for patients who are stable on oral    



   anticoagulant therapy. An INR of 2.0-3.0 is suggested for deep    



   vein thrombosis/pulmonary embolism.    



       









 Specimen

 

 Blood









 Performing Organization  Address  City/[facility]/Zipcode  Phone Number

 

 [facility] DEPARTMENT OF PATHOLOGY AND  [address] [place] [phone]  



 [employer]      



CBC hemogram (2017 10:27 AM)





 Component  Value  Ref Range  Performed At

 

 WBC  13.41 (H)  4.50 - 11.00 k/uL  [facility] DEPARTMENT OF PATHOLOGY AND GENOMIC



       MEDICINE

 

 RBC  3.93 (L)  4.40 - 6.00 m/uL  [facility] DEPARTMENT OF PATHOLOGY AND GENOMIC



       MEDICINE

 

 HGB  11.1 (L)  14.0 - 18.0 g/dL  [facility] DEPARTMENT OF PATHOLOGY AND GENOMIC



       MEDICINE

 

 HCT  34.6 (L)  41.0 - 51.0 %  [facility] DEPARTMENT OF PATHOLOGY AND GENOMIC



       MEDICINE

 

 MCV  88.0  82.0 - 100.0 fL  [facility] DEPARTMENT OF PATHOLOGY AND GENOMIC



       MEDICINE

 

 MCH  28.2  27.0 - 34.0 pg  [facility] DEPARTMENT OF PATHOLOGY AND GENOMIC



       MEDICINE

 

 MCHC  32.1  31.0 - 37.0 g/dL  [facility] DEPARTMENT OF PATHOLOGY AND GENOMIC



       MEDICINE

 

 RDW - SD  45.1  37.0 - 55.0 fL  [facility] DEPARTMENT OF PATHOLOGY AND GENOMIC



       MEDICINE

 

 MPV  10.0  8.8 - 13.2 fL  [facility] DEPARTMENT OF PATHOLOGY AND GENOMIC



       MEDICINE

 

 Platelet count  442 (H)  150 - 400 k/uL  [facility] DEPARTMENT OF PATHOLOGY AND 
GENOMIC



       MEDICINE

 

 Nucleated RBC  0.00  /100 WBC  [facility] DEPARTMENT OF PATHOLOGY AND GENOMIC



       MEDICINE









 Specimen

 

 Blood









 Performing Organization  Address  City/[facility]  Phone Number

 

 [facility] DEPARTMENT OF PATHOLOGY AND  [address] [place] [address]  



 [employer] MEDICINE      



Prepare RBC (2017 10:27 AM)





 Component  Value  Ref Range  Performed At

 

 Product name  Red Blood Cells AS-1,    [facility] DEPARTMENT OF



   Leukored    PATHOLOGY AND GENOMIC



       MEDICINE

 

 Unit number  [id number]    [facility] DEPARTMENT OF



       PATHOLOGY AND GENOMIC



       MEDICINE

 

 Product code  [id number]    [facility] DEPARTMENT OF



       PATHOLOGY AND GENOMIC



       MEDICINE

 

 Dispense status  Returned to  not    [facility] DEPARTMENT OF



   transfused    PATHOLOGY AND GENOMIC



       MEDICINE

 

 Blood expiration date  2017    [facility] DEPARTMENT OF



       PATHOLOGY AND GENOMIC



       MEDICINE

 

 Blood type code  5100    [facility] DEPARTMENT OF



       PATHOLOGY AND GENOMIC



       MEDICINE

 

 Blood type  O POSITIVE    [facility] DEPARTMENT OF



       PATHOLOGY AND GENOMIC



       MEDICINE

 

 Product name  Apheresis Red Cell AS3 #2    [facility] DEPARTMENT OF



   LR    PATHOLOGY AND GENOMIC



       MEDICINE

 

 Unit number  [id number]    [facility] DEPARTMENT OF



       PATHOLOGY AND GENOMIC



       MEDICINE

 

 Product code  [id number]    [facility] DEPARTMENT OF



       PATHOLOGY AND GENOMIC



       MEDICINE

 

 Dispense status  Returned to  not    [facility] DEPARTMENT OF



   transfused    PATHOLOGY AND GENOMIC



       MEDICINE

 

 Blood expiration date  2017    [facility] DEPARTMENT OF



       PATHOLOGY AND GENOMIC



       MEDICINE

 

 Blood type code  5100    [facility] DEPARTMENT OF



       PATHOLOGY AND GENOMIC



       MEDICINE

 

 Blood type  O POSITIVE    [facility] DEPARTMENT OF



       PATHOLOGY AND GENOMIC



       MEDICINE









 Performing Organization  Address  City/State/[facility]  Phone Number

 

 [facility] DEPARTMENT OF PATHOLOGY AND  [address] [place] [phone]  



 GENOMIC MEDICINE      



Type and screen (2017 10:27 AM)





 Component  Value  Ref Range  Performed At

 

 ABO grouping  O    [facility] DEPARTMENT OF PATHOLOGY AND GENOMIC



       MEDICINE

 

 Rh type  POS    [facility] DEPARTMENT OF PATHOLOGY AND GENOMIC



       MEDICINE

 

 Antibody screen (gel)  NEG    [facility] DEPARTMENT OF PATHOLOGY AND GENOMIC



       MEDICINE









 Specimen

 

 Blood









 Performing Organization  Address  City/State/Zipcode  Phone Number

 

 [facility] DEPARTMENT OF PATHOLOGY AND  [address] [place] [address]  



 [facility] MEDICINE      



Comprehensive metabolic panel (2017 10:27 AM)





 Component  Value  Ref Range  Performed At

 

 Sodium  133 (L)  135 - 148 mEq/L  [facility] DEPARTMENT OF



       PATHOLOGY AND GENOMIC



       MEDICINE

 

 Potassium  4.7  3.5 - 5.0 mEq/L  [facility] DEPARTMENT OF



       PATHOLOGY AND GENOMIC



       MEDICINE

 

 Chloride  95 (L)  98 - 112 mEq/L  [facility] DEPARTMENT OF



       PATHOLOGY AND GENOMIC



       MEDICINE

 

 CO2  24  24 - 31 mEq/L  [facility] DEPARTMENT OF



       PATHOLOGY AND GENOMIC



       MEDICINE

 

 Anion gap  14  7 - 15 mEq/L  [facility] DEPARTMENT OF



   Comment:    PATHOLOGY AND GENOMIC



   Starting from  , anion gap calculation    MEDICINE



   no longer incorporates potassium. Please note the change.    



       

 

 BUN  17  8 - 23 mg/dL  [facility] DEPARTMENT OF



       PATHOLOGY AND GENOMIC



       MEDICINE

 

 Creatinine  1.2  0.7 - 1.2 mg/dL  [facility] DEPARTMENT OF



       PATHOLOGY AND GENOMIC



       MEDICINE

 

 Glucose  218 (H)  65 - 99 mg/dL  [facility] DEPARTMENT OF



       PATHOLOGY AND GENOMIC



       MEDICINE

 

 Calcium  9.3  8.8 - 10.2 mg/dL  [facility] DEPARTMENT OF



       PATHOLOGY AND GENOMIC



       MEDICINE

 

 Protein  7.5  6.3 - 8.3 g/dL  [facility] DEPARTMENT OF



   Comment:    PATHOLOGY AND GENOMIC



    4.6-7.0 g/dL    MEDICINE



   1 week 4.4-7.6 g/dL    



   7 months-1year5.1-7.3 g/dL    



   1-2 years5.6-7.5 g/dL    



   >3 years6.0-8.0 g/dL    



    6.3-8.3 g/dL    



       

 

 Albumin  3.0 (L)  3.5 - 5.0 g/dL  [facility] DEPARTMENT OF



       PATHOLOGY AND GENOMIC



       MEDICINE

 

 A/G ratio  0.7  0.7 - 3.8  [facility] DEPARTMENT OF



       PATHOLOGY AND GENOMIC



       MEDICINE

 

 Alkaline phosphatase  167 (H)  40 - 129 U/L  [facility] DEPARTMENT OF



       PATHOLOGY AND GENOMIC



       MEDICINE

 

 AST  39  10 - 50 U/L  [facility] DEPARTMENT OF



       PATHOLOGY AND GENOMIC



       MEDICINE

 

 ALT  38  5 - 50 U/L  [facility] DEPARTMENT OF



       PATHOLOGY AND GENOMIC



       MEDICINE

 

 Total bilirubin  0.4  0.0 - 1.2 mg/dL  [facility] DEPARTMENT OF



       PATHOLOGY AND GENOMIC



       MEDICINE









 Specimen

 

 Plasma specimen









 Performing Organization  Address  City/[facility]  Phone Number

 

 [facility] DEPARTMENT OF PATHOLOGY AND  [address] [place] [address] [id number] [place] [address]      



Urinalysis screen and microscopy, with reflex to culture (2017 10:24 AM)





 Component  Value  Ref Range  Performed At

 

 Specimen site  Random void    [facility] DEPARTMENT OF PATHOLOGY AND



       GENOMIC MEDICINE

 

 Color, UA  Yellow    [facility] DEPARTMENT OF PATHOLOGY AND



       GENOMIC MEDICINE

 

 Appearance, UA  Clear    [facility] DEPARTMENT OF PATHOLOGY AND



       GENOMIC MEDICINE

 

 Specific gravity, UA  1.024  1.001 - 1.035  [facility] DEPARTMENT OF PATHOLOGY AND



       GENOMIC MEDICINE

 

 pH, UA  5.0  5.0 - 8.5  [facility] DEPARTMENT OF PATHOLOGY AND



       GENOMIC MEDICINE

 

 Protein, UA  1+ (A)  Negative  [facility] DEPARTMENT OF PATHOLOGY AND



       GENOMIC MEDICINE

 

 Glucose, UA  Negative  Negative  [facility] DEPARTMENT OF PATHOLOGY AND



       GENOMIC MEDICINE

 

 Ketones, UA  Trace (A)  Negative  [facility] DEPARTMENT OF PATHOLOGY AND



       GENOMIC MEDICINE

 

 Bilirubin, UA  Negative  Negative  [facility] DEPARTMENT OF PATHOLOGY AND



       GENOMIC MEDICINE

 

 Blood, UA  Negative  Negative  [facility] DEPARTMENT OF PATHOLOGY AND



       GENOMIC MEDICINE

 

 Nitrite, UA  Negative  Negative  [facility] DEPARTMENT OF PATHOLOGY AND



       GENOMIC MEDICINE

 

 Urobilinogen, UA  4.0 (A)  <2.0  [facility] DEPARTMENT OF PATHOLOGY AND



       GENOMIC MEDICINE

 

 Leukocyte esterase, UA  Negative  Negative  [facility] DEPARTMENT OF PATHOLOGY AND



       GENOMIC MEDICINE

 

 Epithelial cells, UA  1  /HPF  [facility] DEPARTMENT OF PATHOLOGY AND



       GENOMIC MEDICINE

 

 WBC, UA  1  0 - 1 /HPF  [facility] DEPARTMENT OF PATHOLOGY AND



       GENOMIC MEDICINE

 

 RBC, UA  1  0 - 1 /HPF  [facility] DEPARTMENT OF PATHOLOGY AND



       GENOMIC MEDICINE

 

 Bacteria, UA  Few  None seen  [facility] DEPARTMENT OF PATHOLOGY AND



       GENOMIC MEDICINE

 

 Yeast, UA  None seen    [facility] DEPARTMENT OF PATHOLOGY AND



       GENOMIC MEDICINE

 

 Yeast with pseudohyphae, UA  None seen    [facility] DEPARTMENT OF PATHOLOGY AND



       GENOMIC MEDICINE









 Specimen

 

 Urine









 Performing Organization  Address  City/[facility]/Zipcode  Phone Number

 

 [facility] DEPARTMENT OF PATHOLOGY AND  [address] [place] [address]  



 [facility]      



Urine culture (2017 10:24 AM)





 Component  Value  Ref Range  Performed At

 

 Urine culture  SEE COMMENTComment: Bacteriuria    [facility] DEPARTMENT OF PATHOLOGY



   screen negative.    AND GENOMIC MEDICINE









 Specimen

 

 Urine









 Performing Organization  Address  City/[facility]/Zipcode  Phone Number

 

 [facility] DEPARTMENT OF PATHOLOGY AND  [address] [place] [address] [place] [address]  



 GENOMIC MEDICINE      



Echocardiogram complete w contrast and 3D if needed (2017 12:11 PM)





 Narrative  Performed At

 

  



  [facility]



  



  



  



 Echocardiography Report



  



  



  



 Pat.Name:ROBI ALCOCER  



 Pat.ID:[id number] 



  



 .Date: 2017  



 Refer.MD:BAILEE RENO MD 



  



 Exam Time: 11:42:00 AM Study Type:Routine  



 Echo



  



 Height:66inWeight:  



 163lb 



  



 BSA: 1.84 m2  



 DOBAge:1940,77Y 



  



 Sex:  



 MALEBP:148/84  



 



  



 HR:66 bpm



  



 Sonogrphr: Sofya Almeida, HAMZAH, RVS



  



 Pat.  



 Stat.:[facility]  



  



  



 [facility]Vol[facility] ICD - 9:  



 I65.23



  



 Study Status:Final 



  



 Echo Event ID:[id number]



  



 Order ID:[id number]



  



 Reason for Study:Mitral Valve Disorders, Bilateral Carotid Artery



  



 Stenosis



  



 History / Clinical:h/o Inferior Infarct



  



 Procedures:2D Echo, Colorflow Doppler



  



 Race: 



  



 ------------------------------------



  



 SUMMARY:



  



 ------------------------------------



  



 LV systolic function is normal.



  



  RV systolic function is normal.



  



 ------------------------------------



  



 FINDINGS:



  



 ------------------------------------



  



 LV: LV size is normal. There is mild concentric LV  



 hypertrophy.



  



 LVsystolic function is normal. Overall wall  



 motion is



  



 normal.Estimated EF is 60-64%.



  



 RV: RV size is normal. RV systolic function is normal. RV  



 wall



  



 motionis normal.



  



 LA: LA size is normal.



  



 RA: RA size is normal.



  



 AO: Aortic root diameter is normal.



  



 ROBBIE: No pericardial effusion.



  



 AV: No structural AV abnormalities noted.



  



 MV: No structural MV abnormalities noted. A trace of mitral



  



 regurgitation. 



  



 PV: No structural PV abnormalities noted. A trace of  



 pulmonic



  



 regurgitation. 



  



 TV: No structural TV abnormalities noted. A trace of  



 tricuspid



  



 regurgitation 



  



 Howard: LV relaxation is impaired. LV filling pressure is normal.



  



 Other:Insufficient TR jet to estimate PA systolic pressure.



  



 ------------------------------------



  



 MEASUREMENTS:



  



 ------------------------------------



  



 2D



  



 Parasternal Long Axis



  



  Ao An2.3  



 cmLVPWd1.4 cm



  



  LVOT 2 cmLA  



 Ds3.6 cm



  



  LVIDd4.1  



 cmIndex2.2  



 cm/m



  



  Ao Rtd 3.2 cm



  



  Index1.7 cm/m



  



  LVIDs2.7 cm 



  



  LV Ratl663.4 g(122-174)



  



  LV%fs 32.7 % LVM  



 Bmyrq223.6 g/m2



  



  IVSd 1.2  



 cmRWT0.7 



  



 LA Volume



  



  LA Vol24.5  



 eaGchdg05.3  



 ml/m 



  



  



  



  



  



 Signed 2017 05:34 PM



  



 Samira Rai M.D.  









 Procedure Note

 

 Interface, Radiology Results In - 2017  5:35 PM CDT







                 [facility]



 



                         Echocardiography Report



 



 Pat.Name:  ROBI ALCOCER           Pat.ID:    [id number]



 .Date:   2017               Refer.MD:  BAILEE RENO MD



 Exam Time: 11:42:00 AM             Study Type:Routine Echo



 Height:    66in                    Weight:    163lb



 BSA:       1.84 m2                   Age:  1940,77Y



 Sex:       MALE                    BP:        148/84



 HR:        66 bpm



 Sonogrphr: Sofya Almeida, RDCS, RVS



 Pat. Stat.:Outpatient              Room:      [facility]



 [facility]                   ICD - 9:   I65.23



 Study Status:Final



 Echo Event ID:[id number]



 Order ID:  [id number]



 Reason for Study:Mitral Valve Disorders, Bilateral Carotid Artery



 Stenosis



 History / Clinical:h/o Inferior Infarct



 Procedures:2D Echo, Colorflow Doppler



 Race:      



 ------------------------------------



 SUMMARY:



 ------------------------------------



 LV systolic function is normal.



  RV systolic function is normal.



 ------------------------------------



 FINDINGS:



 ------------------------------------



 LV:       LV size is normal. There is mild concentric LV hypertrophy.



           LV  systolic function is normal. Overall wall motion is



           normal.  Estimated EF is 60-64%.



 RV:       RV size is normal. RV systolic function is normal. RV wall



           motion  is normal.



 LA:       LA size is normal.



 RA:       RA size is normal.



 AO:       Aortic root diameter is normal.



 ROBBIE:     No pericardial effusion.



 AV:       No structural AV abnormalities noted.



 MV:       No structural MV abnormalities noted. A trace of mitral



           regurgitation.



 PV:       No structural PV abnormalities noted. A trace of pulmonic



           regurgitation.



 TV:       No structural TV abnormalities noted. A trace of tricuspid



           regurgitation



 Howard:     LV relaxation is impaired. LV filling pressure is normal.



 Other:    Insufficient TR jet to estimate PA systolic pressure.



 ------------------------------------



 MEASUREMENTS:



 ------------------------------------



                                   2D



 Parasternal Long Axis



  Ao An            2.3 cm            LVPWd            1.4 cm



  LVOT               2 cm            LA Ds            3.6 cm



  LVIDd            4.1 cm            Index        2.2 cm/m



  Ao Rtd           3.2 cm



  Index        1.7 cm/m



  LVIDs            2.7 cm



  LV Mass        192.4 g    (122-174)



  LV%fs           32.7 %             LVM Index      104.6 g/m2



  IVSd             1.2 cm            RWT              0.7



 LA Volume



  LA Vol          24.5 ml            Index        13.3 ml/m



 



 



 Signed 2017 05:34 TITO Rai M.D.









 Performing Organization  Address  City/State/Zipcode  Phone Number

 

  [facility]  [id number] [place] [address]  



after 2017



Insurance







 Payer  Benefit Plan / Group  Subscriber ID  Type  Phone  Address

 

 KEYLA RAMIRES [facility]  xxxxxxxxx  O    









 Guarantor Name  Account Type  Relation to  Date of Birth  Phone  Billing



     Patient      Address

 

 ROBI ALCOCER  Personal/Family  Self  1940  Home:  [address] [place], [place] [phone]  [place] [phone]  [phone]

## 2018-07-10 ENCOUNTER — HOSPITAL ENCOUNTER (INPATIENT)
Dept: HOSPITAL 97 - ER | Age: 78
LOS: 3 days | Discharge: HOME | DRG: 872 | End: 2018-07-13
Attending: FAMILY MEDICINE | Admitting: FAMILY MEDICINE
Payer: COMMERCIAL

## 2018-07-10 DIAGNOSIS — Z87.891: ICD-10-CM

## 2018-07-10 DIAGNOSIS — Z95.5: ICD-10-CM

## 2018-07-10 DIAGNOSIS — C34.31: ICD-10-CM

## 2018-07-10 DIAGNOSIS — A41.9: Primary | ICD-10-CM

## 2018-07-10 DIAGNOSIS — C78.7: ICD-10-CM

## 2018-07-10 DIAGNOSIS — E78.5: ICD-10-CM

## 2018-07-10 DIAGNOSIS — I10: ICD-10-CM

## 2018-07-10 DIAGNOSIS — I25.10: ICD-10-CM

## 2018-07-10 DIAGNOSIS — N30.00: ICD-10-CM

## 2018-07-10 DIAGNOSIS — Z95.1: ICD-10-CM

## 2018-07-10 LAB
ALBUMIN SERPL BCP-MCNC: 1.9 G/DL (ref 3.4–5)
ALP SERPL-CCNC: 452 U/L (ref 45–117)
ALT SERPL W P-5'-P-CCNC: 44 U/L (ref 12–78)
AST SERPL W P-5'-P-CCNC: 82 U/L (ref 15–37)
BUN BLD-MCNC: 16 MG/DL (ref 7–18)
CKMB CREATINE KINASE MB: < 1 NG/ML (ref 0.3–3.6)
GLUCOSE SERPLBLD-MCNC: 137 MG/DL (ref 74–106)
HCT VFR BLD CALC: 27.4 % (ref 39.6–49)
INR BLD: 1.31
LIPASE SERPL-CCNC: 69 U/L (ref 73–393)
LYMPHOCYTES # SPEC AUTO: 0.4 K/UL (ref 0.7–4.9)
MAGNESIUM SERPL-MCNC: 1.8 MG/DL (ref 1.8–2.4)
MCH RBC QN AUTO: 32.8 PG (ref 27–35)
MCV RBC: 98.7 FL (ref 80–100)
NT-PROBNP SERPL-MCNC: 1447 PG/ML (ref ?–450)
PMV BLD: 9.9 FL (ref 7.6–11.3)
POTASSIUM SERPL-SCNC: 4.2 MMOL/L (ref 3.5–5.1)
RBC # BLD: 2.78 M/UL (ref 4.33–5.43)
UA COMPLETE W REFLEX CULTURE PNL UR: (no result)

## 2018-07-10 PROCEDURE — 87040 BLOOD CULTURE FOR BACTERIA: CPT

## 2018-07-10 PROCEDURE — 97163 PT EVAL HIGH COMPLEX 45 MIN: CPT

## 2018-07-10 PROCEDURE — 85025 COMPLETE CBC W/AUTO DIFF WBC: CPT

## 2018-07-10 PROCEDURE — 71045 X-RAY EXAM CHEST 1 VIEW: CPT

## 2018-07-10 PROCEDURE — 84484 ASSAY OF TROPONIN QUANT: CPT

## 2018-07-10 PROCEDURE — 81003 URINALYSIS AUTO W/O SCOPE: CPT

## 2018-07-10 PROCEDURE — 81015 MICROSCOPIC EXAM OF URINE: CPT

## 2018-07-10 PROCEDURE — 96375 TX/PRO/DX INJ NEW DRUG ADDON: CPT

## 2018-07-10 PROCEDURE — 80053 COMPREHEN METABOLIC PANEL: CPT

## 2018-07-10 PROCEDURE — 80048 BASIC METABOLIC PNL TOTAL CA: CPT

## 2018-07-10 PROCEDURE — 83735 ASSAY OF MAGNESIUM: CPT

## 2018-07-10 PROCEDURE — 83605 ASSAY OF LACTIC ACID: CPT

## 2018-07-10 PROCEDURE — 83690 ASSAY OF LIPASE: CPT

## 2018-07-10 PROCEDURE — 93005 ELECTROCARDIOGRAM TRACING: CPT

## 2018-07-10 PROCEDURE — 84145 PROCALCITONIN (PCT): CPT

## 2018-07-10 PROCEDURE — 96365 THER/PROPH/DIAG IV INF INIT: CPT

## 2018-07-10 PROCEDURE — 74177 CT ABD & PELVIS W/CONTRAST: CPT

## 2018-07-10 PROCEDURE — 94760 N-INVAS EAR/PLS OXIMETRY 1: CPT

## 2018-07-10 PROCEDURE — 96361 HYDRATE IV INFUSION ADD-ON: CPT

## 2018-07-10 PROCEDURE — 82550 ASSAY OF CK (CPK): CPT

## 2018-07-10 PROCEDURE — 82553 CREATINE MB FRACTION: CPT

## 2018-07-10 PROCEDURE — 85014 HEMATOCRIT: CPT

## 2018-07-10 PROCEDURE — 83880 ASSAY OF NATRIURETIC PEPTIDE: CPT

## 2018-07-10 PROCEDURE — 80202 ASSAY OF VANCOMYCIN: CPT

## 2018-07-10 PROCEDURE — 82962 GLUCOSE BLOOD TEST: CPT

## 2018-07-10 PROCEDURE — 36415 COLL VENOUS BLD VENIPUNCTURE: CPT

## 2018-07-10 PROCEDURE — 85018 HEMOGLOBIN: CPT

## 2018-07-10 PROCEDURE — 70450 CT HEAD/BRAIN W/O DYE: CPT

## 2018-07-10 PROCEDURE — 85610 PROTHROMBIN TIME: CPT

## 2018-07-10 PROCEDURE — 99285 EMERGENCY DEPT VISIT HI MDM: CPT

## 2018-07-10 PROCEDURE — 80076 HEPATIC FUNCTION PANEL: CPT

## 2018-07-10 PROCEDURE — 85730 THROMBOPLASTIN TIME PARTIAL: CPT

## 2018-07-10 RX ADMIN — CEFTRIAXONE SCH MLS: 1 INJECTION, SOLUTION INTRAVENOUS at 22:00

## 2018-07-10 RX ADMIN — Medication SCH ML: at 22:10

## 2018-07-10 RX ADMIN — MORPHINE SULFATE PRN MG: 4 INJECTION, SOLUTION INTRAMUSCULAR; INTRAVENOUS at 21:58

## 2018-07-10 RX ADMIN — SODIUM CHLORIDE SCH MLS: 0.9 INJECTION, SOLUTION INTRAVENOUS at 21:59

## 2018-07-10 NOTE — XMS REPORT
Clinical Summary

 Created on:July 10, 2018



Patient:Robi Alcocer

Sex:Male

:1940

External Reference #:[id number]





Demographics







 Address  [address] [place] [address]

 

 Home Phone  [phone]

 

 Mobile Phone  [phone]

 

 Email Address  [email or web address]

 

 Preferred Language  English

 

 Marital Status  

 

 Pentecostalism Affiliation  Unknown

 

 Race  White

 

 Ethnic Group  Not  or 









Author







 Organization  [facility]

 

 Address  [address] [place] [address]









Support







 Name  Relationship  Address  Phone

 

 Leodan White  Spouse  Unavailable  [phone]









Care Team Providers







 Name  Role  Phone

 

 Zoya Monaco DO  Primary Care Provider  [phone]









Allergies

No Known Allergies



Current Medications







 Prescription  Sig.  Disp.  Refills  Start Date  End Date  Status

 

 atorvastatin  TK 1 T PO QD    1  2017    Active



 (LIPITOR) 40 MG  HS          



 tablet            

 

 metoprolol tartrate  1 tablet daily    0  2017    Active



 (LOPRESSOR) 50 mg            



 tablet            

 

 aspirin (ECOTRIN)  Take 81 mg by          Active



 81 MG enteric  mouth daily.          



 coated tablet            

 

 clopidogrel  Take 75 mg by          Active



 (PLAVIX) 75 mg  mouth daily.          



 tablet            

 

 clopidogrel  TK 1 T PO QD    1  2017  Discontinued



 (PLAVIX) 75 mg            



 tablet            

 

 traMADol (ULTRAM)  Take 1 tablet  20 tablet  0  2017  10/04/2017  



 50 mg tablet  (50 mg total)          



   by mouth every          



   6 (six) hours          



   as needed for          



   moderate pain          



   for up to 10          



   days.          









 Hospital, Clinic,  Ordered Dose  Route  Frequency  Start Date  End Date  Status



 or Other Facility            



 Administered            



 Medication            

 

 acetaminophen  650 mg  oral  every 6 hours  2017    Active



 (TYLENOL) tablet      PRN      



 650 mg            

 

 acetaminophen  650 mg  oral  every 4 hours  2017  Discontinued



 (TYLENOL) tablet      PRN    7  



 650 mg            







Active Problems







 Problem  Noted Date

 

 Carotid stenosis  2017

 

 CVA (cerebral vascular accident)  2017

 

 Bilateral carotid artery stenosis  2017







Encounters







 Date  Type  Specialty  Care Team  Description

 

 10/05/2017  Office Visit  Cardiovascular  Ch[facility]ler  Surgery follow-up



       Bailee  examination (Primary



       Satish Ellington)



       MD  

 

 10/05/2017  Documentation  Cardiovascular  Asya Joshi MA  

 

 2017  Orders Only  Cardiovascular  Asya Joshi,  Status post carotid



       MA  surgery (Primary Dx)

 

 2017  Hospital Encounter  Cardiology  Chandler,  Arteriosclerosis of



 -      Bailee  carotid artery,



 2017      Rakel,  unspecified laterality



       MD  

 

 2017  Procedure Pass  Cardiothoracic    



     Surgery    

 

 2017  Surgery  Cardiothoracic  Chandler,  LEFT CAROTID



     Surgery  Bailee  ENDARTERECTOMY, TCD



       Rakel,  AND EEG MONITORING



       MD  

 

 2017  Hospital Encounter  Radiology  Chandler,  Preop testing



       Bailee Ellington MD  

 

 2017  Pre-Admit Testing  Pre-Admission  Chandler,  Preop testing (
Primary Dx);



   Appointment  Testing  Bailee  Stenosis of carotid artery, unspecified 
laterality



       MD Rakel  

 

 2017  Anesthesia Event  Cardiothoracic  Alvaro,  



     Surgery  Edilma MORENO,  



       APRN  

 

 2017  Office Visit  Cardiovascular  Chandler,  Bilateral carotid



       Bailee  artery stenosis



       Rakel  (Primary Dx)



       MD  

 

 2017  Orders Only  Cardiovascular  Chaves,  Stenosis of carotid



       Martine, MA  artery, unspecified



         laterality (Primary



         Dx)



after 2017



Family History







 Medical History  Relation  Name  Comments

 

 No Known Problems  Father    

 

 No Known Problems  Mother    









 Relation  Name  Status  Comments

 

 Father      

 

 Mother      







Social History







 Tobacco Use  Types  Packs/Day  Years Used  Date

 

 Former Smoker        Quit: 









 Smokeless Tobacco: Never Used      









 Alcohol Use  Drinks/Week  oz/Week  Comments

 

 Yes      occasinal/random









 Sex Assigned at Birth  Date Recorded

 

 Not on file  







Last Filed Vital Signs







 Vital Sign  Reading  Time Taken

 

 Blood Pressure  121/68  10/05/2017  1:02 PM CDT

 

 Pulse  81  10/05/2017  1:02 PM CDT

 

 Temperature  36.7 C (98.1 F)  10/05/2017  1:02 PM CDT

 

 Respiratory Rate  14  10/05/2017  1:02 PM CDT

 

 Oxygen Saturation  96%  2017  4:57 PM CDT

 

 Inhaled Oxygen Concentration  -  -

 

 Weight  73.5 kg (162 lb)  10/05/2017  1:02 PM CDT

 

 Height  175.3 cm (5' 9")  10/05/2017  1:02 PM CDT

 

 Body Mass Index  23.92  10/05/2017  1:02 PM CDT







Plan of Treatment







 Health Maintenance  Due Date  Last Done  Comments

 

 SHINGRIX VACCINE (#1)  1991    

 

 ZOSTER VACCINE  2001    

 

 PNEUMOCOCCAL POLYSACCHARIDE VACCINE AGE 65 AND OVER  2006    

 

 PNEUMOCOCCAL-13  2006    

 

 INFLUENZA VACCINE  2018    







Implants







 Implanted  Type  Area    Device  Expiration  Model /



         Identifier  Date  Serial / Lot

 

 Clip Ligtng Weck Hemoclip Plus W/ Tape Ti Med - [id number]  Medical  N/A:  
TELEFLEX      [id number] /



 Implanted: 2017 (Quantity not on file)  Clips for  N/A  MEDICAL       /



   Internal          



   Use          

 

 Clip Ligtng Weck Hemoclip Plus W/ Tape Ti Sm Strngpnt - [id number]  Medical  N/A
:  WECK CLOSURE      [id number] /



 Implanted: 2017 (Quantity not on file)  Clips for  N/A  SYSTEMS       /



   Internal          



   Use          

 

 Kit Shunt Crtd Artery Rdopq Line 6in Strl [place] - [id number]  Surgical  N/A:  
COVIDIEN BOZENA    2021  [id number] /



 Implanted: 2017 (Quantity not on file)  Implants;  N/A  HEALTHCARE       
/



   Expanders;          [id number]



   Extenders;          



   Surgical          



   Wires          

 

 Fibrin Sealant Patch Evarrest - [id number]  Surgical  N/A:  [employer]    2018  [id number] /



 Implanted: 2017 (Quantity not on file)  Implants;  N/A  ENDO-SURGERY    
   /



   Expanders;          [id number]



   Extenders;          



   Surgical          



   Wires          

 

 Patch Vasclr Perph 0.8x8cm Vascu-Guard - [id number]  Vascular  N/A:  CALABRESE    
10/04/2021  [id number] /



 Implanted: Qty: 1 on 2017 by Bailee Reno MD  Graft  N/
A  BIOSCIENCE       /



             [id number]







Procedures







 Procedure Name  Priority  Date/Time  Associated  Comments



       Diagnosis  

 

 US CAROTID DUPLEX  Routine  10/05/2017 11:50  Status post  Results for this



 BILATERAL    AM CDT  carotid surgery  procedure are in



         the results



         section.

 

 INTRAOPERATIVE  Routine  2017  2:54    Results for this



 MONITORING    PM CDT    procedure are in



         the results



         section.

 

 CBC WITH PLATELET AND  Routine  2017  5:20    Results for this



 DIFFERENTIAL    AM CDT    procedure are in



         the results



         section.

 

 ESTIMATED GFR  Routine  2017  4:00    Results for this



     AM CDT    procedure are in



         the results



         section.

 

 BASIC METABOLIC PANEL  Routine  2017  4:00    Results for this



     AM CDT    procedure are in



         the results



         section.

 

 GLUCOSE LEVEL, SYRINGE  STAT  2017  4:13    Results for this



     PM CDT    procedure are in



         the results



         section.

 

 IONIZED CALCIUM,  STAT  2017  4:13    Results for this



 ARTERIAL    PM CDT    procedure are in



         the results



         section.

 

 POTASSIUM, SYRINGE  STAT  2017  4:13    Results for this



     PM CDT    procedure are in



         the results



         section.

 

 HEMOGLOBIN, SYRINGE  STAT  2017  4:13    Results for this



     PM CDT    procedure are in



         the results



         section.

 

 SODIUM LEVEL, SYRINGE  STAT  2017  4:13    Results for this



     PM CDT    procedure are in



         the results



         section.

 

 ARTERIAL BLOOD GAS,  STAT  2017  4:13    Results for this



 CORRECTED    PM CDT    procedure are in



         the results



         section.

 

 GLUCOSE LEVEL, SYRINGE  STAT  2017  3:32    Results for this



     PM CDT    procedure are in



         the results



         section.

 

 IONIZED CALCIUM,  STAT  2017  3:32    Results for this



 ARTERIAL    PM CDT    procedure are in



         the results



         section.

 

 SODIUM LEVEL, SYRINGE  STAT  2017  3:32    Results for this



     PM CDT    procedure are in



         the results



         section.

 

 HEMOGLOBIN, SYRINGE  STAT  2017  3:32    Results for this



     PM CDT    procedure are in



         the results



         section.

 

 POTASSIUM, SYRINGE  STAT  2017  3:32    Results for this



     PM CDT    procedure are in



         the results



         section.

 

 ARTERIAL BLOOD GAS,  STAT  2017  3:32    Results for this



 CORRECTED    PM CDT    procedure are in



         the results



         section.

 

 CA AN ELECTIVE  Routine  2017  3:05    



 ENDOTRACHEAL AIRWAY    PM CDT    









   Procedure Note - Janse Clay MD - 2017  3:03 PM CDT



Airway



   Performed by: JANES CLAY



   Authorized by: JANES CLAY



   



   Location:  Endoscopy



   Urgency:  Elective



   Difficult Airway: No



   Anesthesiologist:  JANES CLAY



   Resident/CRNA:  RODRICK RIVERA



   Performed by:



      anesthesiologist and resident/CRNA



   Preoxygenated with 100% O2: Yes



   Mask Ventilation:  Easy mask



   Final Airway Type:  Endotracheal airway



   Final Endotracheal Airway:  ETT



   Cuffed: Yes



   Technique Used:  Direct laryngoscopy



   Insertion Site:  Oral



   Blade Type:  Chiu



   Laryngoscope Blade/Videolaryngoscope Blade Size:  2



   ETT Size (mm):  7.0



   Cuff at minimum occlusion pressure: Yes



   Measured from:  Lips



   ETT to Lips (cm):  23



   Placement Verified by: direct visualization



   Laryngoscopic view:  Grade I - full view of glottis



   Number of Attempts at Approach:  2



    First attempt by student CRNA, adonis 2a view but unable to pass ETT. 2nd 
attempt attending: Mikki. ETT passed atraumatically without stylet. No 
injury to lips, oropharynx, vocal cords









 ARTERIAL LINE  Routine  2017  3:03    Results for this



     PM CDT    procedure are in



         the results



         section.

 

 SURGICAL PATHOLOGY  Routine  2017  7:53    Results for this



 REQUEST    AM CDT    procedure are in



         the results



         section.

 

 XR CHEST 2 VW  Routine  2017 11:55  Preop testing  Results for this



     AM CDT    procedure are in



         the results



         section.

 

 HC COMPLETE BLD COUNT  STAT  2017 11:25  Stenosis of carotid  Results 
for this



 W/AUTO DIFF    AM CDT  artery, unspecified  procedure are in



       laterality  the results



         section.

 

 ECG PRE/POST OP  Routine  2017 11:10  Preop testing  Results for this



     AM CDT    procedure are in



         the results



         section.

 

 PREPARE RBC  Routine  2017 10:27    Results for this



     AM CDT    procedure are in



         the results



         section.

 

 ESTIMATED GFR  STAT  2017 10:27    Results for this



     AM CDT    procedure are in



         the results



         section.

 

 TYPE AND SCREEN  Routine  2017 10:27  Preop testing  Results for this



     AM CDT    procedure are in



         the results



         section.

 

 PARTIAL THROMBOPLASTIN  STAT  2017 10:27  Preop testing  Results for this



 TIME (PTT)    AM CDT    procedure are in



         the results



         section.

 

 PROTHROMBIN TIME WITH  STAT  2017 10:27  Preop testing  Results for this



 INR    AM CDT    procedure are in



         the results



         section.

 

 CBC HEMOGRAM  STAT  2017 10:27  Preop testing  Results for this



     AM CDT    procedure are in



         the results



         section.

 

 COMPREHENSIVE METABOLIC  STAT  2017 10:27  Stenosis of carotid  Results 
for this



 PANEL    AM CDT  artery, unspecified  procedure are in



       laterality  the results



         section.

 

 URINALYSIS SCREEN AND  STAT  2017 10:24    Results for this



 MICROSCOPY, WITH REFLEX    AM CDT    procedure are in



 TO CULTURE        the results



         section.

 

 URINE CULTURE  STAT  2017 10:24    Results for this



     AM CDT    procedure are in



         the results



         section.

 

 ECHOCARDIOGRAM 2D  Routine  2017 12:11    Results for this



 COMPLETE W MMODE    PM CDT    procedure are in



 SPECTRAL COLOR DOPPLER        the results



 (45231)        section.



after 2017



Results

Pv carotid duplex (10/05/2017 11:50 AM)





 Narrative  Performed At

 

 PERIPHERAL VASCULAR LABORATORY



  [facility]



  



  



 Carotid Duplex Report



  



  



  



 [address] [place], [place] [phone]



  



  



  



 For  purposes, the categorization of the degree of the  



 stenosis of this exam is based on criteria described in the IAC carotid  



 stenosis grading white paper( www.intersocietal.org/Vascular) and in  



 KHURRAM Martines., KIMBERLY Fitzpatrick, et al. Carotid



  



  artery stenosis: gray-scale and Doppler US diagnosis--Society of  



 Radiologists in Ultrasound Consensus Conference. Radiology. 2003 Nov;  



 229(2):340-6.



  



  



  



 Pat.Name:ROBI ALCOCER  



 Pat.ID:[id number] 



  



 .Date: 10/5/2017  



 Refer.MD:BAILEE RENO MD 



  



 Exam Time: 10:07:00 AM Study  



 Type:Carotid 



  



 DOBAge:1940,77Y Sex:  



 MALE



  



 Sonogrphr: [facility] RDMS, [facility] CPT - 4:  



 [address] 



  



 Echo Event ID:[id number]



  



 Order ID:[id number]



  



 Reason for Study:Follow-up s/p left carotid endarterectomy with patch



  



 angioplasty on 2017.



  



 Procedures:2017Left carotid endarterectomy with patch



  



 angioplasty.



  



 Race:C 



  



 ------------------------------------



  



 SUMMARY:



  



 ------------------------------------



  



 CAROTID ARTERY SCAN



  



  RIGHT:There is smooth intimal lining in the common carotid artery. 



  



 There is irregular calcified plaque noted in the bulb extending into



  



 the proximal internal carotid artery.Colorflow is  



 disturbed.There



  



 is antegrade flow in the vertebral artery. 



  



  



  



  LEFT:There is smooth homogenous plaque in the common carotid



  



 artery.There is no plaque noted in the bulb and the internal carotid



  



 artery, s/p carotid endarterectomy.Colorflow is normal.There is



  



 antegrade flow in the vertebral artery. 



  



  



  



  PHYSICIAN INTERPRETATION 



  



  



  



  1.50-69% stenosis of the right internal carotid artery.



  



  2.Left internal carotid artery is normal, s/p carotid



  



 endarterectomy.



  



  3. Both vertebral arteries are antegrade



  



  



  



 Carotid  



 Findings:RightLeft  



  



  



 Verteb.Flw  



 AntegradeAntegrade  



 



  



 Subclavian  



 TriphasicBiphasic  



  



  



 ------------------------------------



  



 MEASUREMENTS:



  



 ------------------------------------



  



 DOPPLER



  



 Left Bulb



  



  Bulb  cm/sBulb  



 EDV17.3 cm/s



  



 Left CCA Dist



  



  CCA Dist  cm/sCCA Dist  



 EDV28.3 cm/s



  



 Left CCA Mid



  



  CCA Mid [id number] cm/sCCA Mid EDV  



 18.9 cm/s



  



 Left CCA Prox



  



  CCA Prox  cm/sCCA Prox  



 EDV18.1 cm/s



  



 Left ICA Dist



  



  ICA Dist  cm/Lilia Dist  



 EDV34 cm/s



  



 Left ICA Mid



  



  ICA Mid PSV 44.8 cm/Lilia Mid EDV  



 12.9 cm/s



  



 Left ICA Prox



  



  ICA Prox  cm/Lilia Prox  



 EDV14.1 cm/s



  



 Left ECA Prox



  



  ECA Prox  cm/sECA Prox  



 EDV11.7 cm/s



  



 Left Vertebral



  



  Vertebral PSV 93.5 cm/sVertebral EDV 18.9  



 cm/s



  



 Right Bulb



  



  Bulb  cm/sBulb  



 EDV26.7 cm/s



  



 Right CCA Dist



  



  CCA Dist  cm/sCCA Dist  



 EDV28.3 cm/s



  



 Right CCA Mid



  



  CCA Mid [id number] cm/sCCA Mid EDV  



 22.8 cm/s



  



 Right CCA Prox



  



  CCA Prox  cm/sCCA Prox  



 EDV23.6 cm/s



  



 Right ICA Dist



  



  ICA Dist  cm/Lilia Dist  



 EDV32.2 cm/s



  



 Right ICA Mid



  



  ICA Mid PSV 99.2 cm/Lilia Mid EDV  



 27.5 cm/s



  



 Right ICA Prox



  



  ICA Prox  cm/Lilia Prox  



 EDV45.8 cm/s



  



 Right ECA Prox



  



  ECA Prox  cm/sECA Prox  



 EDV11.8 cm/s



  



 Right Vertebral



  



  Vertebral III283 cm/sVertebral EDV 21.2  



 cm/s



  



 Right ICA/CCA Ratio



  



  ICA/CCA PSV 1.35



  



 Left ICA/CCA Ratio



  



  ICA/CCA PSV1.1 



  



  



  



 Signed 10/05/2017 03:22 PM



  



 Gregor Dacosta MD, RPVI  









 Procedure Note

 

 Interface, Radiology Results In - 10/05/2017  3:22 PM CDT



                    PERIPHERAL [facility] LAB[facility]AT[facility]



 



                         Carotid Duplex Report



 



         [address] [place]  [address] [phone]



 



 For  purposes, the categorization of the degree of the 
stenosis of this exam is based on criteria described in the IAC carotid 
stenosis grading white paper( www.intersocietal.org/Vascular) and in



 KHURRAM Martines., KIMBERLY Fitzpatrick, et al. Carotid



  artery stenosis: gray-scale and Doppler US diagnosis--Society of Radiologists 
in Ultrasound Consensus Conference. Radiology. 2003 Nov; 229(2):340-6.



 



 Pat.Name:  ROBI ALCOCER           Pat.ID:    [id number]



 St.Date:   10/5/2017               Refer.MD:  BAILEE RENO MD



 Exam Time: 10:07:00 AM             Study Type:Carotid



   Age:  1940,77Y           Sex:       MALE



 Sonogrphr: Home JORGE WestMS, RVT CPT - 4:   [address]



 Echo Event ID:[id number]



 Order ID:  [id number]



 Reason for Study:Follow-up s/p left carotid endarterectomy with patch



 angioplasty on 2017.



 Procedures:2017  Left carotid endarterectomy with patch



 angioplasty.



 Race:      C



 ------------------------------------



 SUMMARY:



 ------------------------------------



 CAROTID ARTERY SCAN



  RIGHT:  There is smooth intimal lining in the common carotid artery.



 There is irregular calcified plaque noted in the bulb extending into



 the proximal internal carotid artery.  Colorflow is disturbed.  There



 is antegrade flow in the vertebral artery.



 



  LEFT:  There is smooth homogenous plaque in the common carotid



 artery.  There is no plaque noted in the bulb and the internal carotid



 artery, s/p carotid endarterectomy.  Colorflow is normal.  There is



 antegrade flow in the vertebral artery.



 



  PHYSICIAN INTERPRETATION



 



  1.  50-69% stenosis of the right internal carotid artery.



  2.  Left internal carotid artery is normal, s/p carotid



 endarterectomy.



  3. Both vertebral arteries are antegrade



 



 Carotid Findings:      Right                  Left



 Verteb.Flw             Antegrade              Antegrade



 Subclavian             Triphasic              Biphasic



 ------------------------------------



 MEASUREMENTS:



 ------------------------------------



                                 DOPPLER



 Left Bulb



  Bulb PSV         108 cm/s          Bulb EDV        17.3 cm/s



 Left CCA Dist



  CCA Dist PSV     119 cm/s          CCA Dist EDV    28.3 cm/s



 Left CCA Mid



  CCA Mid PSV      101 cm/s          CCA Mid EDV     18.9 cm/s



 Left CCA Prox



  CCA Prox PSV     115 cm/s          CCA Prox EDV    18.1 cm/s



 Left ICA Dist



  ICA Dist PSV     113 cm/s          ICA Dist EDV      34 cm/s



 Left ICA Mid



  ICA Mid PSV     44.8 cm/s          ICA Mid EDV     12.9 cm/s



 Left ICA Prox



  ICA Prox PSV     106 cm/s          ICA Prox EDV    14.1 cm/s



 Left ECA Prox



  ECA Prox PSV     101 cm/s          ECA Prox EDV    11.7 cm/s



 Left Vertebral



  Vertebral PSV   93.5 cm/s          Vertebral EDV   18.9 cm/s



 Right Bulb



  Bulb PSV         132 cm/s          Bulb EDV        26.7 cm/s



 Right CCA Dist



  CCA Dist PSV     116 cm/s          CCA Dist EDV    28.3 cm/s



 Right CCA Mid



  CCA Mid PSV      124 cm/s          CCA Mid EDV     22.8 cm/s



 Right CCA Prox



  CCA Prox PSV     132 cm/s          CCA Prox EDV    23.6 cm/s



 Right ICA Dist



  ICA Dist PSV     101 cm/s          ICA Dist EDV    32.2 cm/s



 Right ICA Mid



  ICA Mid PSV     99.2 cm/s          ICA Mid EDV     27.5 cm/s



 Right ICA Prox



  ICA Prox PSV     167 cm/s          ICA Prox EDV    45.8 cm/s



 Right ECA Prox



  ECA Prox PSV     123 cm/s          ECA Prox EDV    11.8 cm/s



 Right Vertebral



  Vertebral PSV    124 cm/s          Vertebral EDV   21.2 cm/s



 Right ICA/CCA Ratio



  ICA/CCA PSV     1.35



 Left ICA/CCA Ratio



  ICA/CCA PSV      1.1



 



 Signed 10/05/2017 03:22 PM



 Gregor Dacosta MD, RPVI









 Performing Organization  Address  City/State/Zipcode  Phone Number

 

  [facility]  [address] [place] [address]  



Intraoperative monitoring (2017  2:54 PM)





 Narrative  Performed At

 

 This result has an attachment that is not available.



  



  INTRAOPERATIVE NEURO MONITORING  



   



   



 Patient Name: Robi Alcocer  



 YOB: 1940  



 Gender: male  



 Date of Service: 2017  



   



   



 Surgical Procedure: Left Carotid Endarterectomy  



   



 OR: (FOR)  



 Room #: (4)  



 Tech #1: (KB/TG)  



 Start Time: (1153)  



 End Time: (1605)  



 Total Time (in hours): (4)  



   



 Physician IOM Time: 4  



 Procedure(s) performed During IOM:  



 16688 Electroencephalogram (EEG) during non-intracranial surgery x 1  



 90682 IOM remote or multiple cases x 2  



   



 ICD10:I77.9; I65.29; I63.132  



   



   



   



 Stimulation Parameters  



 Free running EEG recorded with bipolar derivation, using a modified  



 International 10/20 placements: Fp1-C3, C3-O1, Fp2-C4, C4-O2, T3-O1,  



 T4-O2, Fp1-T3, Fp2-T4.  



   



 Technical Summary  



 Intraoperative neurophysiological monitoring was performed using  



 free-running EEG. A real time connection with the examining neurologist  



 was established and maintained throughout the operative procedure by the  



 monitoring technologist. Pre-operative EEG was recorded in the holding  



 area and revealed no asymmetry. Free running EEG demonstrated symmetrical  



 high amplitude delta with superimposed faster frequencies most likely  



 secondary to inhaled anesthetic agents. After clamping of the left carotid  



 artery the EEG was judged to be essentially unchanged and the surgeon was  



 informed.  



   



 Conclusion  



 Free running EEG remained symmetrical throughout the remaining of the  



 procedure. No focal changes occurred. The surgeon was informed of all  



 findings in real time.  



   



   



CBC with platelet and differential (2017  5:20 AM)Only the most recent 
of2 resultswithin the time period is included.





 Component  Value  Ref Range  Performed At

 

 WBC  9.91  4.50 - 11.00 k/uL  [facility] DEPARTMENT OF



       PATHOLOGY AND GENOMIC



       MEDICINE

 

 RBC  3.57 (L)  4.40 - 6.00 m/uL  [facility] DEPARTMENT OF



       PATHOLOGY AND GENOMIC



       MEDICINE

 

 HGB  9.9 (L)  14.0 - 18.0 g/dL  [facility] DEPARTMENT OF



       PATHOLOGY AND GENOMIC



       MEDICINE

 

 HCT  31.6 (L)  41.0 - 51.0 %  [facility] DEPARTMENT OF



       PATHOLOGY AND GENOMIC



       MEDICINE

 

 MCV  88.5  82.0 - 100.0 fL  [facility] DEPARTMENT OF



       PATHOLOGY AND GENOMIC



       MEDICINE

 

 MCH  27.7  27.0 - 34.0 pg  [facility] DEPARTMENT OF



       PATHOLOGY AND GENOMIC



       MEDICINE

 

 MCHC  31.3  31.0 - 37.0 g/dL  [facility] DEPARTMENT OF



       PATHOLOGY AND GENOMIC



       MEDICINE

 

 RDW - SD  45.1  37.0 - 55.0 fL  [facility] DEPARTMENT OF



       PATHOLOGY AND GENOMIC



       MEDICINE

 

 MPV  9.7  8.8 - 13.2 fL  [facility] DEPARTMENT OF



       PATHOLOGY AND GENOMIC



       MEDICINE

 

 Platelet count  387  150 - 400 k/uL  [facility] DEPARTMENT OF



       PATHOLOGY AND GENOMIC



       MEDICINE

 

 Nucleated RBC  0.00  /100 WBC  [facility] DEPARTMENT OF



       PATHOLOGY AND GENOMIC



       MEDICINE

 

 Neutrophils  70.0 (H)  39.0 - 69.0 %  [facility] DEPARTMENT OF



       PATHOLOGY AND GENOMIC



       MEDICINE

 

 Lymphocytes  18.6 (L)  25.0 - 45.0 %  [facility] DEPARTMENT OF



       PATHOLOGY AND GENOMIC



       MEDICINE

 

 Monocytes  8.1  0.0 - 10.0 %  [facility] DEPARTMENT OF



       PATHOLOGY AND GENOMIC



       MEDICINE

 

 Eosinophils  2.3  0.0 - 5.0 %  [facility] [facility] DEPARTMENT OF



       PATHOLOGY AND GENOMIC



       MEDICINE

 

 Basophils  0.5  0.0 - 1.0 %  [facility] DEPARTMENT OF



       PATHOLOGY AND GENOMIC



       MEDICINE

 

 Immature granulocytes  0.5Comment:  0.0 - 1.0 %  [facility] DEPARTMENT OF



   "Immature    PATHOLOGY AND GENOMIC



   granulocytes"    MEDICINE



   (promyelocytes,    



   myelocytes,    



   metamyelocytes)    









 Specimen

 

 Blood









 Performing Organization  Address  City/State/Zipcode  Phone Number

 

 [facility] DEPARTMENT OF PATHOLOGY AND  [address] [place] [address]  



 [employer] [facility]      



Estimated GFR (2017  4:00 AM)Only the most recent of2 resultswithin the 
time period is included.





 Component  Value  Ref Range  Performed At

 

 GFR Non Af Amer  59 (A)  mL/min/1.73 m2  [facility] DEPARTMENT OF



       PATHOLOGY AND GENOMIC



       MEDICINE

 

 GFR Af Amer  71  mL/min/1.73 m2  [facility] DEPARTMENT OF



   Comment:    PATHOLOGY AND GENOMIC



   Chronic kidney disease: <60 mL/min/1.73m2    MEDICINE



   Kidney failure: <15 mL/min/1.73m2    



   The estimated GFR is calculated from the IDMS-traceable Modification of Diet
    



   in Renal Disease Equation. The accuracy of the calculation is poor when the 
   



   creatinine is normal. Calculated values >90 mL/min/1.73m2 are not reported. 
   



   This equation has not been validated in children (<18 years), pregnant    



   women, the elderly (>70 years), or ethnic groups other than Caucasians and  
  



    Americans.    



       









 Specimen

 

 Plasma specimen









 Performing Organization  Address  City/[facility]  Phone Number

 

 [facility] DEPARTMENT OF PATHOLOGY AND  [address] [place] [address]      



Basic metabolic panel (2017  4:00 AM)





 Component  Value  Ref Range  Performed At

 

 Sodium  137  135 - 148 mEq/L  [facility] DEPARTMENT OF PATHOLOGY



       AND GENOMIC MEDICINE

 

 Potassium  4.3  3.5 - 5.0 mEq/L  [facility] DEPARTMENT OF PATHOLOGY



       AND GENOMIC MEDICINE

 

 Chloride  98  98 - 112 mEq/L  [facility] DEPARTMENT OF PATHOLOGY



       AND GENOMIC MEDICINE

 

 CO2  27  24 - 31 mEq/L  [facility] DEPARTMENT OF PATHOLOGY



       AND GENOMIC MEDICINE

 

 Anion gap  12  7 - 15 mEq/L  [facility] DEPARTMENT OF PATHOLOGY



   Comment:    AND [facility]



   Starting from  , anion gap calculation    



   no longer incorporates potassium. Please note the change.    



       

 

 BUN  15  8 - 23 mg/dL  [facility] DEPARTMENT OF PATHOLOGY



       AND GENOMIC MEDICINE

 

 Creatinine  1.2  0.7 - 1.2 mg/dL  [facility] DEPARTMENT OF PATHOLOGY



       AND GENOMIC MEDICINE

 

 Glucose  129 (H)  65 - 99 mg/dL  [facility] DEPARTMENT OF PATHOLOGY



       AND GENOMIC MEDICINE

 

 Calcium  9.5  8.8 - 10.2 mg/dL  [facility] DEPARTMENT OF PATHOLOGY



       AND GENOMIC MEDICINE









 Specimen

 

 Plasma specimen









 Performing Organization  Address  City/[facility]ode  Phone Number

 

 [facility] DEPARTMENT OF PATHOLOGY AND  [address] [place] [id number]  



 GENOMIC MEDICINE      



Sodium level, syringe (2017  4:13 PM)Only the most recent of2 
resultswithin the time period is included.





 Component  Value  Ref Range  Performed At

 

 Sodium, syringe  133 (L)  135 - 148 mEq/L  [facility] DEPARTMENT OF PATHOLOGY AND 
GENOMIC



       MEDICINE









 Specimen

 

 Blood









 Performing Organization  Address  City/[facility]ode  Phone Number

 

 [facility] DEPARTMENT OF PATHOLOGY AND  [address] [place] [address] [phone] [place] [address]      



Potassium, syringe (2017  4:13 PM)Only the most recent of2 resultswithin 
the time period is included.





 Component  Value  Ref Range  Performed At

 

 Potassium, syringe  4.5  3.5 - 5.0 mEq/L  [facility] DEPARTMENT OF PATHOLOGY AND 
GENOMIC



       MEDICINE









 Specimen

 

 Blood









 Performing Organization  Address  City/[facility]  Phone Number

 

 [facility] DEPARTMENT OF PATHOLOGY AND  [address] [place] [address]      



Ionized calcium, arterial (2017  4:13 PM)Only the most recent of2 
resultswithin the time period is included.





 Component  Value  Ref Range  Performed At

 

 Ionized calcium, arterial  1.30  1.11 - 1.32 mmol/L  [facility] DEPARTMENT OF 
PATHOLOGY AND



       GENOMIC MEDICINE









 Specimen

 

 Blood









 Performing Organization  Address  City/[facility]  Phone Number

 

 [facility] DEPARTMENT OF PATHOLOGY AND  [address] [place] [address]      



Hemoglobin, syringe (2017  4:13 PM)Only the most recent of2 resultswithin 
the time period is included.





 Component  Value  Ref Range  Performed At

 

 Hemoglobin, syringe  9.1 (L)  14.0 - 18.0 g/dL  [facility] DEPARTMENT OF PATHOLOGY 
AND GENOMIC



       MEDICINE









 Specimen

 

 Blood









 Performing Organization  Address  City/[facility]  Phone Number

 

 [facility] DEPARTMENT  PATHOLOGY AND  [address] [place] [address]      



Glucose level, syringe (2017  4:13 PM)Only the most recent of2 
resultswithin the time period is included.





 Component  Value  Ref Range  Performed At

 

 Glucose, syringe  172 (H)  65 - 99 mg/dL  [facility] DEPARTMENT OF PATHOLOGY AND 
GENOMIC



       MEDICINE









 Specimen

 

 Blood









 Performing Organization  Address  City/[facility]  Phone Number

 

 [facility] DEPARTMENT OF PATHOLOGY AND  [address] [place] [address]      



Arterial blood gas, corrected (2017  4:13 PM)Only the most recent of2 
resultswithin the time period is included.





 Component  Value  Ref Range  Performed At

 

 pH, arterial  7.37  7.35 - 7.45  [facility] DEPARTMENT OF PATHOLOGY AND GENOMIC



       MEDICINE

 

 pCO2, arterial  36  35 - 45 mmHg  [facility] DEPARTMENT OF PATHOLOGY AND GENOMIC



       MEDICINE

 

 pO2, arterial  304 (H)  80 - 90 mmHg  [facility] DEPARTMENT OF PATHOLOGY AND GENOMIC



       MEDICINE

 

 Temperature, Celsius  37.0  Degrees C  [facility] DEPARTMENT OF PATHOLOGY AND GENOMIC



       MEDICINE

 

 O2 saturation, arterial  100  95 - 100 %  [facility] DEPARTMENT OF PATHOLOGY AND 
GENOMIC



       MEDICINE

 

 pH, arterial corrected  7.37    [facility] DEPARTMENT OF PATHOLOGY AND GENOMIC



       MEDICINE

 

 pCO2, arterial corrected  36  mmHg  [facility] DEPARTMENT OF PATHOLOGY AND GENOMIC



       MEDICINE

 

 pO2, arterial corrected  304  mmHg  [facility] DEPARTMENT OF PATHOLOGY AND GENOMIC



       MEDICINE

 

 Base excess, arterial  -4 (L)  -2 - 2 mEq/L  [facility] DEPARTMENT OF PATHOLOGY AND 
GENOMIC



       MEDICINE









 Specimen

 

 Blood









 Performing Organization  Address  City/[facility]ercode  Phone Number

 

 [facility] DEPARTMENT OF PATHOLOGY AND  [address] [place] [address]  



 GENOMIC MEDICINE      



Arterial line (2017  3:03 PM)





 Narrative  Performed At

 

 Thad Santiago MD 2017 12:34 PM



  



 Arterial line



  



 Performed by: JYOTI SANTIAGO



  



 Authorized by: BAILEE RENO 



  



  



  



 Patient Location:Pre-op



  



 Staff: 



  



 Resident/CRNA:JYOTI SANTIAGO



  



 Pre-procedure: patient identified, IV checked, site and side verified, 



  



 risks and benefits discussed, procedure verified, surgical consent 



  



 complete and pre-op evaluation complete



  



 MSBT: antiseptic used, all elements of maximal sterile barrier technique  



 



  



 followed, hand hygiene performed, cap/gown used by other personnel and 



  



 solutions labeled



  



 Indications: 



  



 Indications: multiple ABGs and hemodynamic monitoring



  



 Anesthesia: 



  



 Anesthesia:General



  



 Procedure Details: 



  



 Arterial Line placement:Placed pre-induction



  



 Line placement site:Radial



  



 Line placement side:Right



  



 Arterial line gauge:20 G



  



 Number of attempts:1



  



 Ultrasound guidance used: No



  



 Post-procedure: 



  



 Post-procedure:Sterile dressing applied



  



 Post procedure circulation, sensation, movement:Normal



  



 Patient tolerance:Patient tolerated the procedure well with no 



  



 immediate complications



  



 Notes: 



  



  Wire removed intact. No apparent complications.  



Surgical pathology request (2017  7:53 AM)





 Component  Value  Ref Range  Performed At

 

 Case number  [id number]    [facility] DEPARTMENT OF



       PATHOLOGY AND GENOMIC



       MEDICINE

 

 Surgical pathology report  See link below for PDF    [facility] DEPARTMENT OF



   Lab Report    PATHOLOGY AND GENOMIC



       MEDICINE









 Performing Organization  Address  City/[facility]ercode  Phone Number

 

 [facility] DEPARTMENT OF PATHOLOGY AND  [address] [place] [address]  



 [facility]      



XR Chest 2 Vw (2017 11:55 AM)





 Narrative  Performed At

 

 EXAMINATION:XR CHEST 2 VW



   [facility]



  



  



 CLINICAL HISTORY:Z01.818 Encounter for other preprocedural  



 examination, preop



  



  



  



 COMPARISON:No priors



  



  



  



 IMPRESSION:



  



  



  



 Heart and mediastinum are appropriate for technique. Lungs are clear. No  



 effusions noted. 



  



  



  



 [facility]-[id number]



  



   









 Procedure Note

 

  Interface, Radiology Results Incoming - 2017 12:04 PM CDT



EXAMINATION:  XR CHEST 2 VW



 



 CLINICAL HISTORY:  Z01.818 Encounter for other preprocedural examination, preop



 



 COMPARISON:  No priors



 



 IMPRESSION:



 



 Heart and mediastinum are appropriate for technique. Lungs are clear. No 
effusions noted.



 



 [facility]-[id number]









 Performing Organization  Address  City/[facility]/Zipcode  Phone Number

 

 [facility]  [address] [place] [address]  



ECG Pre/Post Op (2017 11:10 AM)





 Component  Value  Ref Range  Performed At

 

 Ventricular rate  79    H MUSE

 

 Atrial rate  79    [facility] MUSE

 

 CA interval  136    [facility] MUSE

 

 QRSD interval  84    [facility] MUSE

 

 QT interval  370    [facility] MUSE

 

 QTC interval  424    [facility] MUSE

 

 P axis 1  66    [facility] MUSE

 

 QRS axis 1  73    [facility] MUSE

 

 T wave axis  49    [facility] MUSE

 

 EKG impression  Normal sinus rhythm-Normal ECG-No previous    [facility] MUSE



   ECGs available-Electronically Signed By    



   Julio STEINBERG, Alexys CORTES ([id number]) on 2017    



   3:54:21 PM    









 Performing Organization  Address  City/[facility]ode  Phone Number

 

 [facility] MUSE  [address] [place] [phone]  



Partial thromboplastin time, activated (2017 10:27 AM)





 Component  Value  Ref Range  Performed At

 

 PTT  30.2  23.0 - 36.0 sec  [facility] DEPARTMENT OF PATHOLOGY



   Comment:    AND [employer] [facility] MEDICINE



   PTT therapeutic range for unfractionated heparin is    



   61.0-112.0 seconds which corresponds to Anti-Xa    



   0.3-0.7 U/ml.    



       









 Specimen

 

 Blood









 Performing Organization  Address  City/[facility]ercode  Phone Number

 

 [facility] DEPARTMENT OF PATHOLOGY AND  [address] [place] [phone]  



 GENOMIC MEDICINE      



Prothrombin time with INR (2017 10:27 AM)





 Component  Value  Ref Range  Performed At

 

 Prothrombin time  14.3  12.0 - 15.0 sec  [facility] DEPARTMENT OF



       PATHOLOGY AND GENOMIC



       MEDICINE

 

 INR  1.1    [facility] DEPARTMENT OF



   Comment:    PATHOLOGY AND GENOMIC



   The International Normalized Ratio (INR) is a therapeutic    MEDICINE



   monitoring tool for patients who are stable on oral    



   anticoagulant therapy. An INR of 2.0-3.0 is suggested for deep    



   vein thrombosis/pulmonary embolism.    



       









 Specimen

 

 Blood









 Performing Organization  Address  City/[facility]/Zipcode  Phone Number

 

 [facility] DEPARTMENT OF PATHOLOGY AND  [address] [place] [phone]  



 [employer]      



CBC hemogram (2017 10:27 AM)





 Component  Value  Ref Range  Performed At

 

 WBC  13.41 (H)  4.50 - 11.00 k/uL  [facility] DEPARTMENT OF PATHOLOGY AND GENOMIC



       MEDICINE

 

 RBC  3.93 (L)  4.40 - 6.00 m/uL  [facility] DEPARTMENT OF PATHOLOGY AND GENOMIC



       MEDICINE

 

 HGB  11.1 (L)  14.0 - 18.0 g/dL  [facility] DEPARTMENT OF PATHOLOGY AND GENOMIC



       MEDICINE

 

 HCT  34.6 (L)  41.0 - 51.0 %  [facility] DEPARTMENT OF PATHOLOGY AND GENOMIC



       MEDICINE

 

 MCV  88.0  82.0 - 100.0 fL  [facility] DEPARTMENT OF PATHOLOGY AND GENOMIC



       MEDICINE

 

 MCH  28.2  27.0 - 34.0 pg  [facility] DEPARTMENT OF PATHOLOGY AND GENOMIC



       MEDICINE

 

 MCHC  32.1  31.0 - 37.0 g/dL  [facility] DEPARTMENT OF PATHOLOGY AND GENOMIC



       MEDICINE

 

 RDW - SD  45.1  37.0 - 55.0 fL  [facility] DEPARTMENT OF PATHOLOGY AND GENOMIC



       MEDICINE

 

 MPV  10.0  8.8 - 13.2 fL  [facility] DEPARTMENT OF PATHOLOGY AND GENOMIC



       MEDICINE

 

 Platelet count  442 (H)  150 - 400 k/uL  [facility] DEPARTMENT OF PATHOLOGY AND 
GENOMIC



       MEDICINE

 

 Nucleated RBC  0.00  /100 WBC  [facility] DEPARTMENT OF PATHOLOGY AND GENOMIC



       MEDICINE









 Specimen

 

 Blood









 Performing Organization  Address  City/[facility]ercode  Phone Number

 

 [facility] DEPARTMENT OF PATHOLOGY AND  [address] [place] [phone]  



 [employer] [facility] MEDICINE      



Prepare RBC (2017 10:27 AM)





 Component  Value  Ref Range  Performed At

 

 Product name  Red Blood Cells AS-1,    [facility] DEPARTMENT OF



   Leukored    PATHOLOGY AND GENOMIC



       MEDICINE

 

 Unit number  [id number]    [facility] DEPARTMENT OF



       PATHOLOGY AND GENOMIC



       MEDICINE

 

 Product code  [id number]    [facility] DEPARTMENT OF



       PATHOLOGY AND GENOMIC



       MEDICINE

 

 Dispense status  Returned to  not    [facility] DEPARTMENT OF



   transfused    PATHOLOGY AND GENOMIC



       MEDICINE

 

 Blood expiration date  2017    [facility] DEPARTMENT OF



       PATHOLOGY AND GENOMIC



       MEDICINE

 

 Blood type code  5100    [facility] DEPARTMENT OF



       PATHOLOGY AND GENOMIC



       MEDICINE

 

 Blood type  O POSITIVE    [facility] DEPARTMENT OF



       PATHOLOGY AND GENOMIC



       MEDICINE

 

 Product name  Apheresis Red Cell AS3 #2    [facility] DEPARTMENT OF



   LR    PATHOLOGY AND GENOMIC



       MEDICINE

 

 Unit number  [id number]    [facility] DEPARTMENT OF



       PATHOLOGY AND GENOMIC



       MEDICINE

 

 Product code  [id number]    [facility] DEPARTMENT OF



       PATHOLOGY AND GENOMIC



       MEDICINE

 

 Dispense status  Returned to  not    [facility] DEPARTMENT OF



   transfused    PATHOLOGY AND GENOMIC



       MEDICINE

 

 Blood expiration date  2017    [facility] DEPARTMENT OF



       PATHOLOGY AND GENOMIC



       MEDICINE

 

 Blood type code  5100    [facility] DEPARTMENT OF



       PATHOLOGY AND GENOMIC



       MEDICINE

 

 Blood type  O POSITIVE    [facility] DEPARTMENT OF



       PATHOLOGY AND GENOMIC



       MEDICINE









 Performing Organization  Address  City/State/[facility]  Phone Number

 

 [facility] DEPARTMENT OF PATHOLOGY AND  [address] [place] [phone]  



 GENOMIC MEDICINE      



Type and screen (2017 10:27 AM)





 Component  Value  Ref Range  Performed At

 

 ABO grouping  O    [facility] DEPARTMENT OF PATHOLOGY AND GENOMIC



       MEDICINE

 

 Rh type  POS    [facility] DEPARTMENT OF PATHOLOGY AND GENOMIC



       MEDICINE

 

 Antibody screen (gel)  NEG    [facility] DEPARTMENT OF PATHOLOGY AND GENOMIC



       MEDICINE









 Specimen

 

 Blood









 Performing Organization  Address  City/State/Zipcode  Phone Number

 

 [facility] DEPARTMENT OF PATHOLOGY AND  [address] [place] [phone]  



 [facility] MEDICINE      



Comprehensive metabolic panel (2017 10:27 AM)





 Component  Value  Ref Range  Performed At

 

 Sodium  133 (L)  135 - 148 mEq/L  [facility] DEPARTMENT OF



       PATHOLOGY AND GENOMIC



       MEDICINE

 

 Potassium  4.7  3.5 - 5.0 mEq/L  [facility] DEPARTMENT OF



       PATHOLOGY AND GENOMIC



       MEDICINE

 

 Chloride  95 (L)  98 - 112 mEq/L  [facility] DEPARTMENT OF



       PATHOLOGY AND GENOMIC



       MEDICINE

 

 CO2  24  24 - 31 mEq/L  [facility] DEPARTMENT OF



       PATHOLOGY AND GENOMIC



       MEDICINE

 

 Anion gap  14  7 - 15 mEq/L  [facility] DEPARTMENT OF



   Comment:    PATHOLOGY AND GENOMIC



   Starting from  , anion gap calculation    MEDICINE



   no longer incorporates potassium. Please note the change.    



       

 

 BUN  17  8 - 23 mg/dL  [facility] DEPARTMENT OF



       PATHOLOGY AND GENOMIC



       MEDICINE

 

 Creatinine  1.2  0.7 - 1.2 mg/dL  [facility] DEPARTMENT OF



       PATHOLOGY AND GENOMIC



       MEDICINE

 

 Glucose  218 (H)  65 - 99 mg/dL  [facility] DEPARTMENT OF



       PATHOLOGY AND GENOMIC



       MEDICINE

 

 Calcium  9.3  8.8 - 10.2 mg/dL  [facility] DEPARTMENT OF



       PATHOLOGY AND GENOMIC



       MEDICINE

 

 Protein  7.5  6.3 - 8.3 g/dL  [facility] DEPARTMENT OF



   Comment:    PATHOLOGY AND GENOMIC



    4.6-7.0 g/dL    MEDICINE



   1 week 4.4-7.6 g/dL    



   7 months-1year5.1-7.3 g/dL    



   1-2 years5.6-7.5 g/dL    



   >3 years6.0-8.0 g/dL    



    6.3-8.3 g/dL    



       

 

 Albumin  3.0 (L)  3.5 - 5.0 g/dL  [facility] DEPARTMENT OF



       PATHOLOGY AND GENOMIC



       MEDICINE

 

 A/G ratio  0.7  0.7 - 3.8  [facility] DEPARTMENT OF



       PATHOLOGY AND GENOMIC



       MEDICINE

 

 Alkaline phosphatase  167 (H)  40 - 129 U/L  [facility] DEPARTMENT OF



       PATHOLOGY AND GENOMIC



       MEDICINE

 

 AST  39  10 - 50 U/L  [facility] DEPARTMENT OF



       PATHOLOGY AND GENOMIC



       MEDICINE

 

 ALT  38  5 - 50 U/L  [facility] DEPARTMENT OF



       PATHOLOGY AND GENOMIC



       MEDICINE

 

 Total bilirubin  0.4  0.0 - 1.2 mg/dL  [facility] DEPARTMENT OF



       PATHOLOGY AND GENOMIC



       MEDICINE









 Specimen

 

 Plasma specimen









 Performing Organization  Address  City/[facility]erc[facility]  Phone Number

 

 [facility] DEPARTMENT OF PATHOLOGY AND  [address] [place] [phone] [place] [address]      



Urinalysis screen and microscopy, with reflex to culture (2017 10:24 AM)





 Component  Value  Ref Range  Performed At

 

 Specimen site  Random void    [facility] DEPARTMENT OF PATHOLOGY AND



       GENOMIC MEDICINE

 

 Color, UA  Yellow    [facility] DEPARTMENT OF PATHOLOGY AND



       GENOMIC MEDICINE

 

 Appearance, UA  Clear    [facility] DEPARTMENT OF PATHOLOGY AND



       GENOMIC MEDICINE

 

 Specific gravity, UA  1.024  1.001 - 1.035  [facility] DEPARTMENT OF PATHOLOGY AND



       GENOMIC MEDICINE

 

 pH, UA  5.0  5.0 - 8.5  [facility] DEPARTMENT OF PATHOLOGY AND



       GENOMIC MEDICINE

 

 Protein, UA  1+ (A)  Negative  [facility] DEPARTMENT OF PATHOLOGY AND



       GENOMIC MEDICINE

 

 Glucose, UA  Negative  Negative  [facility] DEPARTMENT OF PATHOLOGY AND



       GENOMIC MEDICINE

 

 Ketones, UA  Trace (A)  Negative  [facility] DEPARTMENT OF PATHOLOGY AND



       GENOMIC MEDICINE

 

 Bilirubin, UA  Negative  Negative  [facility] DEPARTMENT OF PATHOLOGY AND



       GENOMIC MEDICINE

 

 Blood, UA  Negative  Negative  [facility] DEPARTMENT OF PATHOLOGY AND



       GENOMIC MEDICINE

 

 Nitrite, UA  Negative  Negative  [facility] DEPARTMENT OF PATHOLOGY AND



       GENOMIC MEDICINE

 

 Urobilinogen, UA  4.0 (A)  <2.0  [facility] DEPARTMENT OF PATHOLOGY AND



       GENOMIC MEDICINE

 

 Leukocyte esterase, UA  Negative  Negative  [facility] DEPARTMENT OF PATHOLOGY AND



       GENOMIC MEDICINE

 

 Epithelial cells, UA  1  /HPF  [facility] DEPARTMENT OF PATHOLOGY AND



       GENOMIC MEDICINE

 

 WBC, UA  1  0 - 1 /HPF  [facility] DEPARTMENT OF PATHOLOGY AND



       GENOMIC MEDICINE

 

 RBC, UA  1  0 - 1 /HPF  [facility] DEPARTMENT OF PATHOLOGY AND



       GENOMIC MEDICINE

 

 Bacteria, UA  Few  None seen  [facility] DEPARTMENT OF PATHOLOGY AND



       GENOMIC MEDICINE

 

 Yeast, UA  None seen    [facility] DEPARTMENT OF PATHOLOGY AND



       GENOMIC MEDICINE

 

 Yeast with pseudohyphae, UA  None seen    [facility] DEPARTMENT OF PATHOLOGY AND



       GENOMIC MEDICINE









 Specimen

 

 Urine









 Performing Organization  Address  City/[facility]/Zipcode  Phone Number

 

 [facility] DEPARTMENT OF PATHOLOGY AND  [address] [place] [phone]  



 [facility]      



Urine culture (2017 10:24 AM)





 Component  Value  Ref Range  Performed At

 

 Urine culture  SEE COMMENTComment: Bacteriuria    [facility] DEPARTMENT OF PATHOLOGY



   screen negative.    AND GENOMIC MEDICINE









 Specimen

 

 Urine









 Performing Organization  Address  City/[facility]/Zipcode  Phone Number

 

 [facility] DEPARTMENT OF PATHOLOGY AND  [address] [place] [address] [place] [phone]  



 GENOMIC MEDICINE      



Echocardiogram complete w contrast and 3D if needed (2017 12:11 PM)





 Narrative  Performed At

 

  



  [facility]



  



  



  



 Echocardiography Report



  



  



  



 Pat.Name:ROBI ALCOCER  



 Pat.ID:[id number] 



  



 .Date: 2017  



 Refer.MD:BAILEE RENO MD 



  



 Exam Time: 11:42:00 AM Study Type:Routine  



 Echo



  



 Height:66inWeight:  



 163lb 



  



 BSA: 1.84 m2  



 DOBAge:1940,77Y 



  



 Sex:  



 MALEBP:148/84  



 



  



 HR:66 bpm



  



 Sonogrphr: Sofya Almeida, HAMZAH, RVS



  



 Pat.  



 [facility]  



  



  



 [facility] - 9:  



 I65.23



  



 Study Status:Final 



  



 Echo Event ID:[id number]



  



 Order ID:[id number]



  



 Reason for Study:Mitral Valve Disorders, Bilateral Carotid Artery



  



 Stenosis



  



 History / Clinical:h/o Inferior Infarct



  



 Procedures:2D Echo, Colorflow Doppler



  



 Race: 



  



 ------------------------------------



  



 SUMMARY:



  



 ------------------------------------



  



 LV systolic function is normal.



  



  RV systolic function is normal.



  



 ------------------------------------



  



 FINDINGS:



  



 ------------------------------------



  



 LV: LV size is normal. There is mild concentric LV  



 hypertrophy.



  



 LVsystolic function is normal. Overall wall  



 motion is



  



 normal.Estimated EF is 60-64%.



  



 RV: RV size is normal. RV systolic function is normal. RV  



 wall



  



 motionis normal.



  



 LA: LA size is normal.



  



 RA: RA size is normal.



  



 AO: Aortic root diameter is normal.



  



 ROBBIE: No pericardial effusion.



  



 AV: No structural AV abnormalities noted.



  



 MV: No structural MV abnormalities noted. A trace of mitral



  



 regurgitation. 



  



 PV: No structural PV abnormalities noted. A trace of  



 pulmonic



  



 regurgitation. 



  



 TV: No structural TV abnormalities noted. A trace of  



 tricuspid



  



 regurgitation 



  



 Howard: LV relaxation is impaired. LV filling pressure is normal.



  



 Other:Insufficient TR jet to estimate PA systolic pressure.



  



 ------------------------------------



  



 MEASUREMENTS:



  



 ------------------------------------



  



 2D



  



 Parasternal Long Axis



  



  Ao An2.3  



 cmLVPWd1.4 cm



  



  LVOT 2 cmLA  



 Ds3.6 cm



  



  LVIDd4.1  



 cmIndex2.2  



 cm/m



  



  Ao Rtd 3.2 cm



  



  Index1.7 cm/m



  



  LVIDs2.7 cm 



  



  LV Wqog593.4 g(122-174)



  



  LV%fs 32.7 % LVM  



 Mkrqa360.6 g/m2



  



  IVSd 1.2  



 cmRWT0.7 



  



 LA Volume



  



  LA Vol24.5  



 ynHwapv83.3  



 ml/m 



  



  



  



  



  



 Signed 2017 05:34 PM



  



 Samira Rai M.D.  









 Procedure Note

 

 Interface, Radiology Results In - 2017  5:35 PM CDT







                 [facility]



 



                         Echocardiography Report



 



 Pat.Name:  ROBI ALCOCER           Pat.ID:    [id number]



 .Date:   2017               Refer.MD:  BAILEE RENO MD



 Exam Time: 11:42:00 AM             Study Type:Routine Echo



 Height:    66in                    Weight:    163lb



 BSA:       1.84 m2                   Age:  1940,77Y



 Sex:       MALE                    BP:        148/84



 HR:        66 bpm



 Sonogrphr: Sofya Almeida, RDCS, RVS



 Pat. Stat.:Outpatient              Room:      [facility]



 [facility],                   ICD - 9:   I65.23



 Study Status:Final



 Echo Event ID:[id number]



 Order ID:  [id number]



 Reason for Study:Mitral Valve Disorders, Bilateral Carotid Artery



 Stenosis



 History / Clinical:h/o Inferior Infarct



 Procedures:2D Echo, Colorflow Doppler



 Race:      



 ------------------------------------



 SUMMARY:



 ------------------------------------



 LV systolic function is normal.



  RV systolic function is normal.



 ------------------------------------



 FINDINGS:



 ------------------------------------



 LV:       LV size is normal. There is mild concentric LV hypertrophy.



           LV  systolic function is normal. Overall wall motion is



           normal.  Estimated EF is 60-64%.



 RV:       RV size is normal. RV systolic function is normal. RV wall



           motion  is normal.



 LA:       LA size is normal.



 RA:       RA size is normal.



 AO:       Aortic root diameter is normal.



 ORBBIE:     No pericardial effusion.



 AV:       No structural AV abnormalities noted.



 MV:       No structural MV abnormalities noted. A trace of mitral



           regurgitation.



 PV:       No structural PV abnormalities noted. A trace of pulmonic



           regurgitation.



 TV:       No structural TV abnormalities noted. A trace of tricuspid



           regurgitation



 Howard:     LV relaxation is impaired. LV filling pressure is normal.



 Other:    Insufficient TR jet to estimate PA systolic pressure.



 ------------------------------------



 MEASUREMENTS:



 ------------------------------------



                                   2D



 Parasternal Long Axis



  Ao An            2.3 cm            LVPWd            1.4 cm



  LVOT               2 cm            LA Ds            3.6 cm



  LVIDd            4.1 cm            Index        2.2 cm/m



  Ao Rtd           3.2 cm



  Index        1.7 cm/m



  LVIDs            2.7 cm



  LV Mass        192.4 g    (122-174)



  LV%fs           32.7 %             LVM Index      104.6 g/m2



  IVSd             1.2 cm            RWT              0.7



 LA Volume



  LA Vol          24.5 ml            Index        13.3 ml/m



 



 



 Signed 2017 05:34 TITO Rai M.D.









 Performing Organization  Address  City/State/Zipcode  Phone Number

 

  [facility]  [id number] [place] [address]  



after 2017



Insurance







 Payer  Benefit Plan / Group  Subscriber ID  Type  Phone  Address

 

 KEYLA RAMIRES [facility]  xxxxxxxxx  O    









 Guarantor Name  Account Type  Relation to  Date of Birth  Phone  Billing



     Patient      Address

 

 ROBI ALCOCER  Personal/Family  Self  1940  Home:  [address] [place], [place] [phone]  [place] [phone]  [phone]

## 2018-07-10 NOTE — RAD REPORT
EXAM DESCRIPTION:  Ragini Single View7/10/2018 3:09 pm

 

CLINICAL HISTORY:  Chest pain

 

COMPARISON:  November 2017

 

FINDINGS:   The lungs appear clear of acute infiltrate. The heart is normal size

 

IMPRESSION:   No acute abnormalities displayed

## 2018-07-10 NOTE — EKG
Test Date:    2018-07-10               Test Time:    14:18:45

Technician:   GISELLE                                     

                                                     

MEASUREMENT RESULTS:                                       

Intervals:                                           

Rate:         117                                    

WV:           130                                    

QRSD:         68                                     

QT:           296                                    

QTc:          412                                    

Axis:                                                

P:            18                                     

WV:           130                                    

QRS:          61                                     

T:            0                                      

                                                     

INTERPRETIVE STATEMENTS:                                       

                                                     

Sinus tachycardia

Possible Inferior infarct, age undetermined

Abnormal ECG

Compared to ECG 10/16/2017 13:38:36

Sinus rhythm no longer present



Electronically Signed On 07-10-18 21:34:39 CDT by Lopez Sood

## 2018-07-10 NOTE — RAD REPORT
EXAM DESCRIPTION:  CTAbdomen   Pelvis W Contrast - 7/10/2018 4:54 pm

 

CLINICAL HISTORY:  Abdominal pain.

flank pain;Nausea / vomiting

 

COMPARISON:  Chest Single View dated 7/10/2018; Thorax W/ Con dated 5/2/2018; Thorax W/ Con dated 3/1
2/2018; Liver Biopsy Proceduree dated 11/21/2017; Chest Abdomen Pelvis W Cont dated 11/12/2017

 

TECHNIQUE:  Biphasic CT imaging of the abdomen and pelvis was performed with 100 ml non-ionic IV cont
rast.

 

All CT scans are performed using dose optimization technique as appropriate and may include automated
 exposure control or mA/KV adjustment according to patient size.

 

FINDINGS:  Rounded soft tissue mass is identified in the medial right lower lobe measuring 3.0 x 3.0 
cm, mildly increased in size relative to 05/02/2018 study which time it measured 3.0 x 2.7 cm. Linear
 atelectasis is present in the medial right lung base. Trace pleural fluid is seen bilaterally.

 

Large hepatic mass is identified occupying the majority of the left lobe of the liver, measuring appr
oximately 13.6 x 10.1 cm. Several additional masses are present in the liver, the largest in the post
erior right lobe measures 4.0 x 4.1 cm. 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 Chol
ecystectomy clips are seen. The spleen, pancreas are normal. Nodularity involves the left adrenal gla
nd. The right adrenal gland has a normal appearance. No hydronephrosis or solid renal mass is identif
ied. Mild bilateral perinephric fat stranding.

 

No bowel obstruction, free air, free fluid or abscess. Bilateral inguinal hernias are present, larger
 on the right. The appendix is normal. Mild thickening of the urinary bladder anteriorly. No bulky ly
mphadenopathy is seen in the abdomen or pelvis.

 

No lytic or blastic bone lesion.

 

IMPRESSION:  Disease progression is suspected involving the soft tissue mass in the medial right lowe
r lobe as well as the extensive neoplastic liver masses. No acute abnormality is seen.

## 2018-07-10 NOTE — RAD REPORT
EXAM DESCRIPTION:  CT - Head Brain Wo Cont - 7/10/2018 2:35 pm

 

CLINICAL HISTORY:   lung cancer headache

 

COMPARISON:  . August 2017

 

TECHNIQUE:  Computed axial tomography of the head was obtained. IV contrast was not requested.

 

All CT scans are performed using dose optimization technique as appropriate and may include automated
 exposure control or mA/KV adjustment according to patient size.

 

FINDINGS:  An intracranial  bleed is not seen .

 

The ventricles are normal in caliber.

 

No extra-axial fluid collection is noted.

 

Fluid within the sinuses/ mastoids is not seen.

 

IMPRESSION:  No acute intracranial abnormality is seen. If patient's symptoms persist  MRI of the bra
in would be recommended.

## 2018-07-10 NOTE — EDPHYS
Physician Documentation                                                                           

 [facility]                                                                

Name: Robi Alcocer                                                                                

Age: 78 yrs                                                                                       

Sex: Male                                                                                         

: 1940                                                                                   

MRN: [id number]                                                                                   

Arrival Date: 07/10/2018                                                                          

Time: 13:35                                                                                       

Account#: [id number]                                                                            

Bed 20                                                                                            

Private MD: Zoya Monaco                                                                              

ED Physician Juancho Mata                                                                         

HPI:                                                                                              

07/10                                                                                             

14:25 This 78 yrs old  Male presents to ER via Wheelchair with complaints of          cp  

      Weakness, Vomiting.                                                                         

14:25 The patient presents to the emergency department with weakness of the entire body,      cp  

      generalized weakness. Onset: The symptoms/episode began/occurred 1 week(s) ago.             

      Associated signs and symptoms: Pertinent positives: fever, nausea, vomiting, Pertinent      

      negatives: altered mental status.                                                           

14:25 Patient's baseline: Neuro: alert and fully oriented, Motor: no deficits, Ambulation:    cp  

      walks without assistance, Speech: normal.                                                   

                                                                                                  

Historical:                                                                                       

- Allergies:                                                                                      

13:58 No Known Allergies;                                                                     aj  

- Home Meds:                                                                                      

13:58 atorvastatin Oral [Active]; clopidogrel Oral [Active]; Metoprolol Tartrate Oral         aj  

      [Active];                                                                                   

- PMHx:                                                                                           

13:58 Hyperlipidemia; Hypertension; TIA; Cancer, Lung; Cancer, Liver;                         aj  

- PSHx:                                                                                           

13:58 Carotid SX; Cholecystectomy; Heart stents;                                              aj  

                                                                                                  

- Immunization history:: Adult Immunizations up to date.                                          

- Social history:: Smoking status: Patient/guardian denies using tobacco.                         

- Ebola Screening: : Patient negative for fever greater than or equal to 101.5 degrees            

  Fahrenheit, and additional compatible Ebola Virus Disease symptoms Patient denies               

  exposure to infectious person Patient denies travel to an Ebola-affected area in the            

  21 days before illness onset No symptoms or risks identified at this time.                      

                                                                                                  

                                                                                                  

ROS:                                                                                              

14:28 Constitutional: Positive for poor PO intake, low grade fever.                           cp  

14:28 ENT: Negative for injury, pain, and discharge.                                          cp  

14:30 Neck: Negative for pain with movement, pain at rest, stiffness, tenderness.             cp  

14:30 Cardiovascular: Positive for edema, Negative for chest pain, palpitations.                  

14:30 Respiratory: Negative for cough, shortness of breath, wheezing.                             

14:30 Abdomen/GI: Positive for nausea, vomiting, anorexia, Negative for diarrhea,             cp  

      constipation, dysphagia, black/tarry stool, rectal bleeding.                                

14:30 Back: Positive for flank pain, bilaterally.                                                 

14:30 : Positive for                                                                            

                                                                                                  

Exam:                                                                                             

14:25 ECG was reviewed by the Attending Physician.                                            cp  

14:33 Constitutional: The patient appears in no acute distress, alert, awake,                 cp  

      non-diaphoretic, non-toxic, well developed, well nourished.                                 

14:33 Head/Face:  Normocephalic, atraumatic.                                                  cp  

14:33 Eyes: Periorbital structures: appear normal, Pupils: equal, round, and reactive to          

      light and accomodation, Extraocular movements: intact throughout, Conjunctiva: normal,      

      no exudate, no injection, Sclera: no appreciated abnormality, Lids and lashes: appear       

      normal, bilaterally.                                                                        

14:33 ENT: External ear(s): are unremarkable, Ear canal(s): are normal, clear, TM's: bulging,     

      is not appreciated, bilaterally, dullness, bilaterally, erythema, is not appreciated,       

      bilaterally, Nose: is normal, Mouth: Lips: dry, Oral mucosa: pink and intact, dry,          

      Posterior pharynx: is normal, airway is patent, no erythema, no exudate, Uvula:             

      midline, swelling, is not appreciated, Voice: is normal.                                    

14:33 Neck: ROM/movement: is normal, is supple, without pain, no range of motions                 

      limitations, no meningismus, no nuchal rigidity.                                            

14:33 Chest/axilla: Inspection: normal, Palpation: is normal, no crepitus, no tenderness.         

14:33 Cardiovascular: Rate: tachycardic, Rhythm: regular, Edema: ankle edema, that is mild,       

      JVD: is not appreciated.                                                                    

14:33 Respiratory: the patient does not display signs of respiratory distress,  Respirations:     

      normal, no use of accessory muscles, no retractions, no splinting, no tachypnea,            

      labored breathing, is not present, Breath sounds: decreased breath sounds, that are         

      mild, throughout, rhonchi, are not appreciated, stridor, is not appreciated, wheezing:      

      is not appreciated.                                                                         

14:33 Abdomen/GI: Inspection: abdomen appears normal, Bowel sounds: active, all quadrants,        

      Palpation: soft, in all quadrants, mild abdominal tenderness, in the anterior aspect of     

      left lateral abdomen, posterior aspect of left lateral abdomen, anterior aspect of          

      right lateral abdomen and posterior aspect of right lateral abdomen, rebound                

      tenderness, is not appreciated, involuntary guarding, is not appreciated.                   

14:33 Back: vertebral tenderness, is not appreciated.                                             

14:33 Skin: cellulitis, is not appreciated, no rash present.                                      

14:33 Neuro: Orientation: to person, place \T\ time. Mentation: is normal, Cerebellar function:   

      Romberg testing is negative, normal finger to nose testing, Motor: moves all fours,         

      general weakness w/o focal deficits, Sensation: no obvious gross deficits.                  

                                                                                                  

Vital Signs:                                                                                      

13:58  / 52; Pulse 128; Resp 20; Temp 100.0; Pulse Ox 95% on R/A; Weight 72.57 kg;      aj  

      Height 5 ft. 8 in. (172.72 cm);                                                             

15:30  / 60; Pulse 102; Resp 18; Temp 98.3(O); Pulse Ox 95% on R/A;                     ph  

17:21  / 53; Pulse 89; Resp 18; Pulse Ox 98% on R/A;                                    ph  

18:30  / 51; Pulse 86; Resp 18; Pulse Ox 98% on R/A;                                    ph  

20:01  / 59; Pulse 89; Resp 20; Temp 99.1(O); Pulse Ox 98% on R/A;                      aj  

13:58 Body Mass Index 24.33 (72.57 kg, 172.72 cm)                                               

                                                                                                  

MDM:                                                                                              

13:58 Patient medically screened.                                                               

17:50 Data reviewed: vital signs, nurses notes, lab test result(s), EKG, radiologic studies,    

      CT scan, plain films.                                                                       

17:50 Counseling: I had a detailed discussion with the patient and/or guardian regarding: the   

      historical points, exam findings, and any diagnostic results supporting the                 

      discharge/admit diagnosis, lab results, radiology results, the need for further work-up     

      and treatment in the hospital. Response to treatment: the patient's symptoms have           

      mildly improved after treatment, and as a result, I will admit patient.                     

17:59 Physician consultation: Reed Shepherd MD was contacted at 17:55, regarding admission, to   

      the telemetry unit. patient's condition.                                                    

                                                                                                  

07/10                                                                                             

14:13 Order name: Blood Culture Adult (2)                                                       

07/10                                                                                             

14:13 Order name: Lactate                                                                       

07/10                                                                                             

14:13 Order name: Procalcitonin                                                                 

07/10                                                                                             

14:13 Order name: Basic Metabolic Panel                                                         

07/10                                                                                             

14:13 Order name: CBC with Diff                                                                 

07/10                                                                                             

14:13 Order name: Ckmb; Complete Time: 15:50                                                  cp  

07/10                                                                                             

14:13 Order name: CPK; Complete Time: 15:50                                                   cp  

07/10                                                                                             

15:50 Interpretation: CPK 29; Reviewed.                                                         

07/10                                                                                             

14:13 Order name: LFT's; Complete Time: 15:50                                                 cp  

07/10                                                                                             

15:51 Interpretation: Normal except: AST 82; ; BILID 0.3; TP 6.0; ALB 1.9; GLOB 4.1;   cp  

      A/G 0.5.                                                                                    

07/10                                                                                             

14:13 Order name: Magnesium; Complete Time: 15:50                                             cp  

07/10                                                                                             

14:13 Order name: NT PRO-BNP; Complete Time: 15:50                                            cp  

07/10                                                                                             

15:58 Interpretation: Abnormal: NT PRO-BNP 1447.                                                

07/10                                                                                             

14:13 Order name: PT-INR; Complete Time: 14:59                                                cp  

07/10                                                                                             

14:13 Order name: Ptt, Activated; Complete Time: 14:59                                        cp  

07/10                                                                                             

14:13 Order name: Troponin (emerg Dept Use Only); Complete Time: 15:50                          

07/10                                                                                             

14:13 Order name: Lipase; Complete Time: 15:50                                                cp  

07/10                                                                                             

14:13 Order name: Blood Culture                                                               EDMS

07/10                                                                                             

14:13 Order name: Lactate; Complete Time: 15:50                                               EDMS

07/10                                                                                             

15:58 Interpretation: Abnormal: LAC 3.3.                                                        

07/10                                                                                             

14:13 Order name: Procalcitonin; Complete Time: 15:50                                         EDMS

07/10                                                                                             

15:51 Interpretation: Procalcitonin 1.40; Reviewed.                                             

07/10                                                                                             

14:13 Order name: Basic Metabolic Panel; Complete Time: 15:50                                 EDMS

07/10                                                                                             

15:59 Interpretation: Normal except: GLUC 137; GFR 65.                                          

07/10                                                                                             

14:13 Order name: CBC with Automated Diff; Complete Time: 14:59                               EDMS

07/10                                                                                             

14:59 Interpretation: Normal except: RBC 2.78; HGB 9.1; HCT 27.4; RDW 18.9; PHILIPP% 80.5; LYM%   cp  

      4.4; MN% 14.7; LYMA 0.4.                                                                    

07/10                                                                                             

16:23 Order name: Urine Microscopic Only; Complete Time: 17:46                                ph  

07/10                                                                                             

17:46 Order name: Lactate Sepsis 2 HR Follow-up; Complete Time: 17:46                         EDMS

07/10                                                                                             

17:51 Order name: Urine Dipstick--Ancillary (enter results); Complete Time: 18:43             eb  

07/10                                                                                             

18:09 Order name: Comprehensive Metabolic Panel                                               [facility]

07/10                                                                                             

18:09 Order name: Comprehensive Metabolic Panel                                               [facility]

07/10                                                                                             

18:09 Order name: Comprehensive Metabolic Panel                                               [facility]

07/10                                                                                             

18:09 Order name: Comprehensive Metabolic Panel                                               [facility]

07/10                                                                                             

18:10 Order name: CBC with Automated Diff                                                     EDMS

07/10                                                                                             

18:10 Order name: CBC with Automated Diff                                                     EDMS

07/10                                                                                             

18:10 Order name: CBC with Automated Diff                                                     EDMS

07/10                                                                                             

18:10 Order name: CBC with Automated Diff                                                     EDMS

07/10                                                                                             

14:13 Order name: Orthostatics; Complete Time: 19:29                                          cp  

07/10                                                                                             

14:13 Order name: XRAY Chest (1 view); Complete Time: 15:50                                   cp  

07/10                                                                                             

14:13 Order name: EKG; Complete Time: 14:13                                                   cp  

07/10                                                                                             

14:13 Order name: Cardiac monitoring; Complete Time: 15:48                                    cp  

07/10                                                                                             

14:13 Order name: EKG - Nurse/Tech; Complete Time: 14:36                                      cp  

07/10                                                                                             

14:13 Order name: IV Saline Lock; Complete Time: 14:36                                        cp  

07/10                                                                                             

14:13 Order name: Labs collected and sent; Complete Time: 14:36                               cp  

07/10                                                                                             

14:13 Order name: O2 Per Protocol; Complete Time: 14:36                                       cp  

07/10                                                                                             

14:13 Order name: O2 Sat Monitoring; Complete Time: 14:36                                       

07/10                                                                                             

14:13 Order name: CT Head Brain wo Cont; Complete Time: 14:59                                 cp  

07/10                                                                                             

15:00 Interpretation: Report reviewed.                                                          

07/10                                                                                             

15:53 Order name: Cath; Complete Time: 16:24                                                  cp  

07/10                                                                                             

16:27 Order name: CT Abd/Pelvis - W/Contrast: no oral contrast; Complete Time: 17:22          cp  

07/10                                                                                             

18:10 Order name: Heart Healthy                                                               [facility]

07/10                                                                                             

18:10 Order name: Patient Safety Orders                                                       [facility]

07/10                                                                                             

18:11 Order name: Procalcitonin                                                               [facility]

07/10                                                                                             

18:11 Order name: Procalcitonin                                                               EDMS

                                                                                                  

EC:25 Rate is 117 beats/min. Rhythm is regular. MS interval is normal. QRS interval is        cp  

      normal. QT interval is normal. T waves are Inverted in leads aVF, V3, V4. Interpreted       

      by me. Reviewed by me.                                                                      

                                                                                                  

Administered Medications:                                                                         

15:03 Drug: Zofran 4 mg Route: IVP; Site: right antecubital;                                  ph  

15:49 Follow up: Response: No adverse reaction; Nausea is decreased                           ph  

15:04 Drug: NS 0.9% 500 ml Route: IV; Rate: bolus; Site: right antecubital;                   ph  

15:48 Follow up: Response: No adverse reaction; IV Status: Completed infusion; IV Intake:     ph  

      500ml                                                                                       

15:48 Drug: NS 0.9% 1000 ml Route: IV; Rate: 75 ml/hr; Site: right antecubital;               ph  

19:29 Follow up: Response: No adverse reaction; IV Status: Infusion continued upon admission  ph  

19:12 Drug: Rocephin 2 grams Route: IV; Rate: calculated rate; Site: right antecubital;       aj  

20:03 Follow up: Response: No adverse reaction; IV Status: Completed infusion; IV Intake: 40mlaj  

                                                                                                  

                                                                                                  

Disposition:                                                                                      

                                                                                             

06:59 Co-signature as Attending Physician, Juancho Mata MD.                                    rn  

                                                                                                  

Disposition:                                                                                      

07/10/18 17:58 Hospitalization ordered by Reed Shepherd for Observation. Preliminary diagnosis    

  are Dehydration, Other sepsis.                                                                  

- Bed requested for Telemetry/MedSurg (observation).                                              

- Status is Observation.                                                                      aj  

- Condition is Stable.                                                                            

- Problem is new.                                                                                 

- Symptoms have improved.                                                                         

UTI on Admission? No                                                                              

                                                                                                  

                                                                                                  

                                                                                                  

Signatures:                                                                                       

Dispatcher [employer]                           [facility]                                                 

Rand Graves RN RN dw Myers, Amanda, RN RN aj Nieto, Roman, MD MD rn Hall, Patricia, RN RN ph Page, ROMÁN Minor   cp                                                   

                                                                                                  

Corrections: (The following items were deleted from the chart)                                    

07/10                                                                                             

18:16 18:10 Lactate ordered. [facility]                                                             EDMS

19:02 17:58 Hospitalization Ordered by Reed Shepherd MD for Observation. Preliminary diagnosis dw  

      is Dehydration; Other sepsis. Bed requested for Telemetry/MedSurg (observation). Status     

      is Observation. Condition is Stable. Problem is new. Symptoms have improved. UTI on         

      Admission? No. cp                                                                           

20:19 19:02 07/10/2018 17:58 Hospitalization Ordered by Reed Shepherd MD for Observation.      aj  

      Preliminary diagnosis is Dehydration; Other sepsis. Bed requested for Telemetry/MedSurg     

      (observation). Status is Observation. Condition is Stable. Problem is new. Symptoms         

      have improved. UTI on Admission? No. dw                                                     

                                                                                                  

**************************************************************************************************

## 2018-07-10 NOTE — ER
Nurse's Notes                                                                                     

 [facility]                                                                

Name: Robi Alcocer                                                                                

Age: 78 yrs                                                                                       

Sex: Male                                                                                         

: 1940                                                                                   

MRN: [id number]                                                                                   

Arrival Date: 07/10/2018                                                                          

Time: 13:35                                                                                       

Account#: [id number]                                                                            

Bed 20                                                                                            

Private MD: Zoya Monaco                                                                              

Diagnosis: Dehydration;Other sepsis                                                               

                                                                                                  

Presentation:                                                                                     

07/10                                                                                             

13:56 Presenting complaint: Patient states: Bilateral flank pain for 1 week with intermittent aj  

      fever. Transition of care: patient was not received from another setting of care. Onset     

      of symptoms was 2018. Risk Assessment: Do you want to hurt yourself or someone     

      else? Patient reports no desire to harm self or others. Initial Sepsis Screen: Does the     

      patient meet any 2 criteria? HR > 90 bpm. No. Patient's initial sepsis screen is            

      negative. Does the patient have a suspected source of infection? No. Patient's initial      

      sepsis screen is negative. Care prior to arrival: None.                                     

13:56 Method Of Arrival: Wheelchair                                                           aj  

13:56 Acuity: JARRETT 3                                                                           aj  

                                                                                                  

Triage Assessment:                                                                                

13:58 General: Appears in no apparent distress. uncomfortable, ill, Behavior is calm,         aj  

      cooperative, appropriate for age. Pain: Complains of pain in anterior aspect of left        

      lateral abdomen, posterior aspect of left lateral abdomen, anterior aspect of right         

      lateral abdomen and posterior aspect of right lateral abdomen. Neuro: Level of              

      Consciousness is awake, alert, obeys commands, Oriented to person, place, time,             

      situation, Appropriate for age. Respiratory: Airway is patent Respiratory effort is         

      even, unlabored, Respiratory pattern is regular, symmetrical. Derm: Skin is healthy         

      with good turgor, Skin is normal, Skin temperature is hot.                                  

                                                                                                  

Historical:                                                                                       

- Allergies:                                                                                      

13:58 No Known Allergies;                                                                     aj  

- Home Meds:                                                                                      

13:58 atorvastatin Oral [Active]; clopidogrel Oral [Active]; Metoprolol Tartrate Oral         aj  

      [Active];                                                                                   

- PMHx:                                                                                           

13:58 Hyperlipidemia; Hypertension; TIA; Cancer, Lung; Cancer, Liver;                         aj  

- PSHx:                                                                                           

13:58 Carotid SX; Cholecystectomy; Heart stents;                                              aj  

                                                                                                  

- Immunization history:: Adult Immunizations up to date.                                          

- Social history:: Smoking status: Patient/guardian denies using tobacco.                         

- Ebola Screening: : Patient negative for fever greater than or equal to 101.5 degrees            

  Fahrenheit, and additional compatible Ebola Virus Disease symptoms Patient denies               

  exposure to infectious person Patient denies travel to an Ebola-affected area in the            

  21 days before illness onset No symptoms or risks identified at this time.                      

                                                                                                  

                                                                                                  

Screening:                                                                                        

15:11 Abuse screen: Denies threats or abuse. Denies injuries from another. Nutritional        ph  

      screening: No deficits noted. Tuberculosis screening: No symptoms or risk factors           

      identified. Fall Risk None identified.                                                      

                                                                                                  

Assessment:                                                                                       

14:15 General: Appears in no apparent distress. comfortable, slender, well groomed, Behavior  ph  

      is calm, cooperative, appropriate for age, Reports chills for 2-3 days, fever for 2-3       

      days. Pain: Complains of pain in posterior aspect of right lateral abdomen and anterior     

      aspect of right lateral abdomen and posterior aspect of left lateral abdomen and            

      anterior aspect of left lateral abdomen. Neuro: Level of Consciousness is awake, alert,     

      obeys commands, Oriented to person, place, time, situation, Reports dizziness, "general     

      weakness". Cardiovascular: Reports fatigue, lightheadedness, nausea, vomiting, Denies       

      chest pain, shortness of breath, Capillary refill < 3 seconds in bilateral fingers          

      Patient's skin is warm and dry. Respiratory: Airway is patent Respiratory effort is         

      even, unlabored. GI: Reports nausea, vomiting, Patient currently denies diarrhea. :       

      Reports pain in bilateral flank(s), Denies burning with urination, inability to void,       

      urinary frequency. Derm: Skin is intact, Skin is pink, warm \T\ dry. Musculoskeletal:       

      Circulation, motion, and sensation intact. Range of motion: intact in all extremities.      

15:40 Reassessment: Patient appears in no apparent distress at this time. Patient and/or      ph  

      family updated on plan of care and expected duration. Pain level reassessed. Patient is     

      alert, oriented x 3, equal unlabored respirations, skin warm/dry/pink. Pt resting           

      quietly, awaiting lab and radiology results, family at bedside.                             

17:20 Reassessment: Patient appears in no apparent distress at this time. Patient and/or      ph  

      family updated on plan of care and expected duration. Pain level reassessed. Patient is     

      alert, oriented x 3, equal unlabored respirations, skin warm/dry/pink. Pt resting           

      quietly, denies pain or nausea at this time, family at bedside.                             

18:30 Reassessment: Patient appears in no apparent distress at this time. Patient and/or      ph  

      family updated on plan of care and expected duration. Pain level reassessed. Patient is     

      alert, oriented x 3, equal unlabored respirations, skin warm/dry/pink. Pt resting           

      quietly, reports pain 4/10 in L flank, denies nausea at this time, family at bedside,       

      awaiting room assignment.                                                                   

                                                                                                  

Vital Signs:                                                                                      

13:58  / 52; Pulse 128; Resp 20; Temp 100.0; Pulse Ox 95% on R/A; Weight 72.57 kg;      aj  

      Height 5 ft. 8 in. (172.72 cm);                                                             

15:30  / 60; Pulse 102; Resp 18; Temp 98.3(O); Pulse Ox 95% on R/A;                     ph  

17:21  / 53; Pulse 89; Resp 18; Pulse Ox 98% on R/A;                                    ph  

18:30  / 51; Pulse 86; Resp 18; Pulse Ox 98% on R/A;                                    ph  

20:01  / 59; Pulse 89; Resp 20; Temp 99.1(O); Pulse Ox 98% on R/A;                      aj  

13:58 Body Mass Index 24.33 (72.57 kg, 172.72 cm)                                               

                                                                                                  

ED Course:                                                                                        

13:35 Patient arrived in ED.                                                                  rg4 

13:36 Zoya Monaco MD is Private Physician.                                                      rg4 

13:54 [facility]                                                                cp  

13:54 Juancho Mata MD is Attending Physician.                                                cp  

13:57 Triage completed.                                                                       aj  

13:58 Arm band placed on left wrist. Patient placed in an exam room.                          aj  

14:17 Patient moved to CT.                                                                    vm2 

14:24 Ivet Marcial, RN is Primary Nurse.                                                    ph  

14:25 EKG done, by EKG tech. reviewed by Iván [place].                                       sm3 

14:30 Initial lab(s) drawn, by me, sent to lab. First set of blood cultures drawn by me.      dh3 

      Inserted saline lock: 20 gauge in right antecubital area, using aseptic technique.          

      Blood collected.                                                                            

14:35 CT completed. Patient tolerated procedure well. Patient moved back from CT.             kc3 

14:36 CT Head Brain wo Cont In Process Unspecified.                                           EDMS

15:05 X-ray completed. Portable x-ray completed in exam room. Patient tolerated procedure     ml  

      well.                                                                                       

15:06 XRAY Chest (1 view) In Process Unspecified.                                             EDMS

15:40 Patient has correct armband on for positive identification. Placed in gown. Bed in low  ph  

      position. Call light in reach. Side rails up X 1. Cardiac monitor on. Pulse ox on. NIBP     

      on. Warm blanket given.                                                                     

16:54 CT Abd/Pelvis - W/Contrast: no oral contrast In Process Unspecified.                    EDMS

17:25 Repeat lab(s) drawn. by me, sent to lab.                                                3 

17:57 Reed Shepherd MD is Hospitalizing Provider.                                            cp  

19:07 No provider procedures requiring assistance completed.                                  ph  

19:08 Patient admitted, IV remains in place.                                                  ph  

19:15 Primary Nurse role handed off by Ivet Marcial RN aj  

19:15 Edilma David, RN is Primary Nurse.                                                     aj  

20:01 Report given to Sherie WEINSTEIN.                                                              renetta  

                                                                                                  

Administered Medications:                                                                         

15:03 Drug: Zofran 4 mg Route: IVP; Site: right antecubital;                                  ph  

15:49 Follow up: Response: No adverse reaction; Nausea is decreased                           ph  

15:04 Drug: NS 0.9% 500 ml Route: IV; Rate: bolus; Site: right antecubital;                   ph  

15:48 Follow up: Response: No adverse reaction; IV Status: Completed infusion; IV Intake:     ph  

      500ml                                                                                       

15:48 Drug: NS 0.9% 1000 ml Route: IV; Rate: 75 ml/hr; Site: right antecubital;               ph  

19:29 Follow up: Response: No adverse reaction; IV Status: Infusion continued upon admission  ph  

19:12 Drug: Rocephin 2 grams Route: IV; Rate: calculated rate; Site: right antecubital;       aj  

20:03 Follow up: Response: No adverse reaction; IV Status: Completed infusion; IV Intake: 40mlaj  

                                                                                                  

                                                                                                  

Intake:                                                                                           

15:48 IV: 500ml; Total: 500ml.                                                                ph  

20:03 IV: 40ml; Total: 540ml.                                                                 renetta  

                                                                                                  

Outcome:                                                                                          

17:58 Decision to Hospitalize by Provider.                                                    cp  

20:01 Admitted to Med/surg accompanied by tech, via wheelchair, room 231, Report called to    renetta Rehman                                                                                      

20:01 Condition: stable                                                                           

20:01 Instructed on the need for admit.                                                           

20:19 Patient left the ED.                                                                    renetta  

                                                                                                  

Signatures:                                                                                       

Dispatcher [employer]                           EDMS                                                 

Edilma David, RN                       Tresa Mora Patricia, RN                      RN                                                      

Iván Thorne PA PA cp Garcia, Rubi                                 rg4                                                  

Josephine Calloway                            2                                                  

Aisha Aguilar                              3                                                  

Charley Harry                                kc3                                                  

Sangeeta Scott                              3                                                  

                                                                                                  

Corrections: (The following items were deleted from the chart)                                    

19:23 19:22 Reassessment: Patient assisted to bedside commode. Call light placed within reach aj  

      aj                                                                                          

                                                                                                  

**************************************************************************************************

## 2018-07-11 LAB
ALBUMIN SERPL BCP-MCNC: 1.7 G/DL (ref 3.4–5)
ALP SERPL-CCNC: 369 U/L (ref 45–117)
ALT SERPL W P-5'-P-CCNC: 37 U/L (ref 12–78)
AST SERPL W P-5'-P-CCNC: 88 U/L (ref 15–37)
BUN BLD-MCNC: 14 MG/DL (ref 7–18)
GLUCOSE SERPLBLD-MCNC: 107 MG/DL (ref 74–106)
HCT VFR BLD CALC: 25.4 % (ref 39.6–49)
LYMPHOCYTES # SPEC AUTO: 1.2 K/UL (ref 0.7–4.9)
MAGNESIUM SERPL-MCNC: 2 MG/DL (ref 1.8–2.4)
MCH RBC QN AUTO: 33.2 PG (ref 27–35)
MCV RBC: 98.7 FL (ref 80–100)
PMV BLD: 9.9 FL (ref 7.6–11.3)
POTASSIUM SERPL-SCNC: 4.9 MMOL/L (ref 3.5–5.1)
RBC # BLD: 2.57 M/UL (ref 4.33–5.43)

## 2018-07-11 RX ADMIN — MORPHINE SULFATE PRN MG: 4 INJECTION, SOLUTION INTRAMUSCULAR; INTRAVENOUS at 09:27

## 2018-07-11 RX ADMIN — CEFEPIME SCH MLS: 1 INJECTION, POWDER, FOR SOLUTION INTRAMUSCULAR; INTRAVENOUS at 14:48

## 2018-07-11 RX ADMIN — Medication SCH: at 20:10

## 2018-07-11 RX ADMIN — CEFTRIAXONE SCH MLS: 1 INJECTION, SOLUTION INTRAVENOUS at 09:21

## 2018-07-11 RX ADMIN — ASPIRIN SCH MG: 81 TABLET, COATED ORAL at 09:20

## 2018-07-11 RX ADMIN — SODIUM CHLORIDE SCH MLS: 0.9 INJECTION, SOLUTION INTRAVENOUS at 05:40

## 2018-07-11 RX ADMIN — SODIUM CHLORIDE SCH MLS: 0.9 INJECTION, SOLUTION INTRAVENOUS at 20:10

## 2018-07-11 RX ADMIN — Medication SCH ML: at 09:21

## 2018-07-11 RX ADMIN — FOLIC ACID SCH MG: 1 TABLET ORAL at 09:20

## 2018-07-11 RX ADMIN — CEFEPIME SCH MLS: 1 INJECTION, POWDER, FOR SOLUTION INTRAMUSCULAR; INTRAVENOUS at 17:35

## 2018-07-11 RX ADMIN — ATORVASTATIN CALCIUM SCH MG: 40 TABLET, FILM COATED ORAL at 20:10

## 2018-07-11 RX ADMIN — SODIUM CHLORIDE SCH MLS: 9 INJECTION, SOLUTION INTRAVENOUS at 09:21

## 2018-07-11 NOTE — HP
Date of Admission:  07/10/2018



Chief Complaint:  Generalized weakness, fever, chills.



Code Status:  Do not intubate.  The patient does not have a medical power of 
 or living will.



History Of Present Illness:  The patient is a 78-year-old male with past 
medical history of hypertension, liver cancer, currently on chemotherapy, last 
treatment 2 weeks ago, hyperlipidemia, and coronary artery bypass graft.  The 
patient was in his usual state of health until few days prior to admission when 
the patient started having some generalized weakness along with some nausea, 
vomiting, decreased p.o. intake, and felt dehydrated.  The patient denies any 
dysuria or hematuria.  No abdominal pain or diarrhea.  No chest pain or 
shortness of breath.  Does have a cough which is intermittent and without any 
sputum production.  The patient does report some high-grade fevers of 102 and 
103 for the past few days.  The patient does report some tenderness along the 
lateral aspect of the chest wall.  The patient's symptoms are constant, moderate
, progressively worsening.  No alleviating factors.  The patient came in for 
further evaluation.  Upon arrival, his vital signs showed a pulse of 128, 
temperature of 100.  Workup showed elevated lactate and procalcitonin level.  
His white count was normal.  Did have a positive UA.  The patient's head CT 
scan was negative for any acute changes.  CT scan of the abdomen and pelvis was 
also done, which showed some disease progression suspected involving the soft 
tissue mass in the medial right lower lobe as well as the extensive neoplastic 
liver masses.  No acute abnormality.  The patient was then referred for 
inpatient, was given half liter bolus, and felt better.  When seen in the ER, 
he was awake, alert, and oriented x3, in some mild distress.



Past Medical History:  Hypertension, liver cancer with diffuse metastases, 
coronary artery disease, hyperlipidemia.



Past Surgical History:  Coronary bypass graft, stent placement.



Allergies:  NO KNOWN DRUG ALLERGIES.



Medications:  List reviewed.



Family History:  No history of premature coronary artery disease.



Social History:  The patient quit smoking in 2000.  Used to drink alcohol.  The 
patient is , has a son, good social support.  Independent in his 
activities of daily living.



Review of Systems:

An 11-point system reviewed, negative except as per HPI.



Physical Examination:

Vital Signs:  Blood pressure 106/52, pulse 128, respirations 20, temperature 100
, O2 95% on room air. 

General:  Awake, alert, oriented x3, in some mild distress, elderly male, ill-
appearing. 

CV:  S1 and S2.  No murmurs.  Peripheral pulses present. 

HEENT:  Normocephalic, atraumatic.  PERRLA.  EOMI.  Dry mucous membranes.  
Oropharynx is clear.  Poor dentition.  Conjunctivae anicteric. 

Neck:  Supple.  No JVD.  Trachea midline. 

Respiratory:  Clear to auscultation bilaterally.  No wheezing.  No stridor.  No 
use of accessory muscles. 

Gastrointestinal:  Abdomen is soft, nontender, and nondistended.  Some mild 
tenderness on the right lateral aspect. 

Extremities:  No clubbing or cyanosis.  The patient does have 2+ edema in lower 
extremity.  No calf tenderness. 

Neuro:  Cranial nerves 2 through 12 intact grossly.  No focal neurological 
deficit.  Speech is normal.  Strength is 5/5 bilateral upper and lower 
extremities.  Sensation intact to light touch.



Laboratory Data:  INR 1.31.  WBC 8.4, H and H 9.1 and 27.4, platelets 185.  
Sodium 137, potassium 4.2, chloride 102, CO2 of 24, BUN 16, creatinine 1.0, 
glucose 137, lactate 3.3, repeat lactate is 2, calcium 8.6, AST 82, ALT 44.  
Troponin less than 0.02.  BNP 1446, procalcitonin 1.4, lipase 69.  UA; negative 
nitrite, negative leukocyte, 10-20 wbc's, 20-50 bacteria.  Head CT scan shows 
no acute intracranial abnormality.  CT scan of the abdomen and pelvis shows 
disease progression suspected involving the soft tissue mass in the medial 
right lower lobe of the liver as well as extensive neoplastic liver masses.  No 
acute abnormality is seen.



Assessment And Plan:  A 78-year-old male with;

1.   Systemic inflammatory response syndrome.  The patient is tachycardic, rate 
of 128, elevated lactate and procalcitonin level.  Possible source of infection 
is urinary tract infection.

2.   Urinary tract infection, acute cystitis, without hematuria.  We will 
continue Rocephin.  Obtain urine culture.

3.   Lung cancer with diffuse liver metastases.  CT scan shows disease 
progression.  The patient is currently on chemotherapy, sees Dr. Corea.

4.   Hypertension.

5.   Hyperlipidemia.

6.   Coronary artery disease, status post stent and CABG, native artery and 
native heart, without angina.

7.   Gastrointestinal and deep venous thrombosis prophylaxes with PPI and SCDs.



Plan:  Admit the patient to Med-Surg, place as patient.





KRUNAL

DD:  07/10/2018 18:39:02   Voice ID:  [id number]

MTDD

## 2018-07-11 NOTE — EKG
Test Date:    2018-07-11               Test Time:    12:44:03

Technician:   KAYLA                                     

                                                     

MEASUREMENT RESULTS:                                       

Intervals:                                           

Rate:         90                                     

CT:           140                                    

QRSD:         72                                     

QT:           348                                    

QTc:          425                                    

Axis:                                                

P:            27                                     

CT:           140                                    

QRS:          59                                     

T:            19                                     

                                                     

INTERPRETIVE STATEMENTS:                                       

                                                     

Normal sinus rhythm

Normal ECG

Compared to ECG 07/10/2018 14:18:45

Sinus tachycardia no longer present

Myocardial infarct finding no longer present



Electronically Signed On 07-11-18 15:32:29 CDT by Lopez Sood

## 2018-07-12 LAB
ALBUMIN SERPL BCP-MCNC: 1.5 G/DL (ref 3.4–5)
ALP SERPL-CCNC: 302 U/L (ref 45–117)
ALT SERPL W P-5'-P-CCNC: 32 U/L (ref 12–78)
ANISOCYTOSIS BLD QL: (no result)
AST SERPL W P-5'-P-CCNC: 86 U/L (ref 15–37)
BUN BLD-MCNC: 16 MG/DL (ref 7–18)
GLUCOSE SERPLBLD-MCNC: 98 MG/DL (ref 74–106)
HCT VFR BLD CALC: 22.2 % (ref 39.6–49)
HCT VFR BLD CALC: 24.2 % (ref 39.6–49)
LYMPHOCYTES # SPEC AUTO: 1.2 K/UL (ref 0.7–4.9)
MCH RBC QN AUTO: 33.6 PG (ref 27–35)
MCV RBC: 98.3 FL (ref 80–100)
MORPHOLOGY BLD-IMP: (no result)
PMV BLD: 9.3 FL (ref 7.6–11.3)
POTASSIUM SERPL-SCNC: 4.9 MMOL/L (ref 3.5–5.1)
RBC # BLD: 2.26 M/UL (ref 4.33–5.43)

## 2018-07-12 RX ADMIN — CEFEPIME SCH MLS: 1 INJECTION, POWDER, FOR SOLUTION INTRAMUSCULAR; INTRAVENOUS at 08:16

## 2018-07-12 RX ADMIN — SODIUM CHLORIDE SCH MLS: 0.9 INJECTION, SOLUTION INTRAVENOUS at 12:31

## 2018-07-12 RX ADMIN — CEFEPIME SCH MLS: 1 INJECTION, POWDER, FOR SOLUTION INTRAMUSCULAR; INTRAVENOUS at 16:32

## 2018-07-12 RX ADMIN — SODIUM CHLORIDE SCH MLS: 9 INJECTION, SOLUTION INTRAVENOUS at 04:14

## 2018-07-12 RX ADMIN — FOLIC ACID SCH MG: 1 TABLET ORAL at 08:15

## 2018-07-12 RX ADMIN — SODIUM CHLORIDE SCH MLS: 9 INJECTION, SOLUTION INTRAVENOUS at 22:14

## 2018-07-12 RX ADMIN — ASPIRIN SCH MG: 81 TABLET, COATED ORAL at 08:15

## 2018-07-12 RX ADMIN — CEFEPIME SCH MLS: 1 INJECTION, POWDER, FOR SOLUTION INTRAMUSCULAR; INTRAVENOUS at 01:15

## 2018-07-12 RX ADMIN — Medication SCH ML: at 08:16

## 2018-07-12 RX ADMIN — Medication SCH ML: at 21:16

## 2018-07-12 RX ADMIN — ATORVASTATIN CALCIUM SCH MG: 40 TABLET, FILM COATED ORAL at 21:16

## 2018-07-13 LAB
ALBUMIN SERPL BCP-MCNC: 1.4 G/DL (ref 3.4–5)
ALP SERPL-CCNC: 297 U/L (ref 45–117)
ALT SERPL W P-5'-P-CCNC: 32 U/L (ref 12–78)
AST SERPL W P-5'-P-CCNC: 80 U/L (ref 15–37)
BUN BLD-MCNC: 14 MG/DL (ref 7–18)
GLUCOSE SERPLBLD-MCNC: 97 MG/DL (ref 74–106)
HCT VFR BLD CALC: 24.7 % (ref 39.6–49)
LYMPHOCYTES # SPEC AUTO: 1.3 K/UL (ref 0.7–4.9)
MCH RBC QN AUTO: 33.6 PG (ref 27–35)
MCV RBC: 98.9 FL (ref 80–100)
PMV BLD: 9.6 FL (ref 7.6–11.3)
POTASSIUM SERPL-SCNC: 3.9 MMOL/L (ref 3.5–5.1)
RBC # BLD: 2.5 M/UL (ref 4.33–5.43)

## 2018-07-13 RX ADMIN — SODIUM CHLORIDE SCH MLS: 0.9 INJECTION, SOLUTION INTRAVENOUS at 04:36

## 2018-07-13 RX ADMIN — SODIUM CHLORIDE SCH: 0.9 INJECTION, SOLUTION INTRAVENOUS at 00:20

## 2018-07-13 RX ADMIN — CEFEPIME SCH MLS: 1 INJECTION, POWDER, FOR SOLUTION INTRAMUSCULAR; INTRAVENOUS at 00:47

## 2018-07-13 RX ADMIN — ASPIRIN SCH MG: 81 TABLET, COATED ORAL at 09:33

## 2018-07-13 RX ADMIN — CEFEPIME SCH MLS: 1 INJECTION, POWDER, FOR SOLUTION INTRAMUSCULAR; INTRAVENOUS at 09:32

## 2018-07-13 RX ADMIN — Medication SCH ML: at 09:33

## 2018-07-13 RX ADMIN — FOLIC ACID SCH MG: 1 TABLET ORAL at 09:32

## 2018-07-13 NOTE — DS
Date of Discharge:  07/12/2018



Admitting Diagnoses:  

1.Systemic inflammatory response syndrome.

2.Urinary tract infection, acute cystitis without hematuria.

3.Lung cancer with diffuse liver metastases.

4.Hypertension.

5.Hyperlipidemia.

6.Coronary artery disease, status post stent and coronary artery bypass grafting, native artery, lucy
rosi heart without angina.



Discharge Diagnoses:  

1.Systemic inflammatory response syndrome, early sepsis, likely secondary to urinary tract infection
.

2.Urinary tract infection, acute cystitis without hematuria.

3.Lung cancer with diffuse liver metastases.

4.Essential hypertension.

5.Hyperlipidemia, mixed.

6.Coronary artery disease, status post stent and coronary artery bypass grafting, native artery, lucy
rosi heart without angina.



Hospital Course:  The patient is a 78-year-old male with past medical history of hypertension with ca
ncer with diffuse mets to the liver, who is currently on chemotherapy, follows with Dr. Corea, comes i
n with generalized weakness, fever, and chills.  The patient was thought to be septic.  He was tachyc
ardic.  His labs showed elevated lactate level and procalcitonin level.  His white count was elevated
 at 12, with left shift.  The patient did not have a clear-cut source of infection, possible UTI, or 
spontaneous bacterial peritonitis.  Head CT scan and abdominal CT scan were done, do not show any par
ticular source of infection.  However, the CT scan did show soft tissue mass in the medial right lowe
r lobe as well as extensive neoplastic liver masses.  The patient was started on broad-spectrum IV an
tibiotics.  Blood cultures were obtained, which remained negative.  His repeat procalcitonin level ha
d jumped up to 13.  However, clinically the patient appeared significantly better.  He was then afebr
ile.  Did not have any temperature spikes after the initial spike in the ER.  He responded well.  His
 WBC count did go up to 13.  Spoke with Dr. Corea, the patient's oncologist, regarding the patient's p
ossible sepsis.  She stated that this may be related to his malignancy.  The patient, however, was cl
inically improved.  His blood cultures remained negative.  He was doing well overall, able to ambulat
e with his walker.  The patient was then cleared for discharge and was sent home in a stable conditio
n.



Activity:  As tolerated with fall precautions.



Medications:  As per medication reconciliation list.  Finish up course of p.o. antibiotics.



Diet:  Heart healthy.



Followup:  Follow up with PCP in 2 to 3 days.  Follow up with oncologist, Dr. Corea in 2 weeks.  Retur
n to ER for worsening condition.



Physical Examination:

General:  Awake, alert, oriented x3, not in any acute distress.  Elderly male. 

CV:  S1, S2.  No murmurs. 

Respiratory:  Moving air well bilaterally.  No wheezing. 

Gastrointestinal:  Abdomen is soft, nontender, nondistended.  Positive bowel sounds. 

Extremities:  No clubbing or cyanosis.  The patient does have pedal edema. 

Neuro:  Nonfocal. 



Total time spent discharging the patient was 41 minutes.





/BEAU

DD:  07/12/2018 15:13:04Voice ID:  [id number]

DT:  07/13/2018 05:30:39Report ID:  [id number]